# Patient Record
Sex: FEMALE | Race: WHITE | NOT HISPANIC OR LATINO | Employment: OTHER | URBAN - METROPOLITAN AREA
[De-identification: names, ages, dates, MRNs, and addresses within clinical notes are randomized per-mention and may not be internally consistent; named-entity substitution may affect disease eponyms.]

---

## 2017-03-27 ENCOUNTER — HISTORICAL (OUTPATIENT)
Dept: ADMINISTRATIVE | Facility: HOSPITAL | Age: 77
End: 2017-03-27

## 2017-04-10 ENCOUNTER — HISTORICAL (OUTPATIENT)
Dept: ADMINISTRATIVE | Facility: HOSPITAL | Age: 77
End: 2017-04-10

## 2017-04-25 ENCOUNTER — HISTORICAL (OUTPATIENT)
Dept: ADMINISTRATIVE | Facility: HOSPITAL | Age: 77
End: 2017-04-25

## 2017-11-03 ENCOUNTER — HISTORICAL (OUTPATIENT)
Dept: ADMINISTRATIVE | Facility: HOSPITAL | Age: 77
End: 2017-11-03

## 2017-11-03 LAB
BUN SERPL-MCNC: 17 MG/DL (ref 7–18)
CALCIUM SERPL-MCNC: 9.7 MG/DL (ref 8.5–10.1)
CHLORIDE SERPL-SCNC: 106 MMOL/L (ref 98–107)
CHOLEST SERPL-MCNC: 185 MG/DL (ref 0–200)
CHOLEST/HDLC SERPL: 2.7 {RATIO} (ref 0–4)
CO2 SERPL-SCNC: 32 MMOL/L (ref 21–32)
CREAT SERPL-MCNC: 0.64 MG/DL (ref 0.55–1.02)
DEPRECATED CALCIDIOL+CALCIFEROL SERPL-MC: 49.1 NG/ML (ref 30–80)
GLUCOSE SERPL-MCNC: 88 MG/DL (ref 74–106)
HDLC SERPL-MCNC: 68 MG/DL (ref 35–60)
LDLC SERPL CALC-MCNC: 84 MG/DL (ref 0–129)
POTASSIUM SERPL-SCNC: 4.5 MMOL/L (ref 3.5–5.1)
SODIUM SERPL-SCNC: 145 MMOL/L (ref 136–145)
TRIGL SERPL-MCNC: 166 MG/DL (ref 30–150)
VLDLC SERPL CALC-MCNC: 33 MG/DL

## 2018-05-08 ENCOUNTER — HISTORICAL (OUTPATIENT)
Dept: ADMINISTRATIVE | Facility: HOSPITAL | Age: 78
End: 2018-05-08

## 2018-05-08 LAB
ABS NEUT (OLG): 2.9 X10(3)/MCL (ref 2.1–9.2)
ALBUMIN SERPL-MCNC: 4 GM/DL (ref 3.4–5)
ALBUMIN/GLOB SERPL: 1.4 {RATIO}
ALP SERPL-CCNC: 85 UNIT/L (ref 38–126)
ALT SERPL-CCNC: 28 UNIT/L (ref 12–78)
APPEARANCE, UA: CLEAR
AST SERPL-CCNC: 19 UNIT/L (ref 15–37)
BACTERIA SPEC CULT: ABNORMAL /HPF
BASOPHILS # BLD AUTO: 0 X10(3)/MCL (ref 0–0.2)
BASOPHILS NFR BLD AUTO: 0 %
BILIRUB SERPL-MCNC: 1.3 MG/DL (ref 0.2–1)
BILIRUB UR QL STRIP: NEGATIVE
BILIRUBIN DIRECT+TOT PNL SERPL-MCNC: 0.2 MG/DL (ref 0–0.2)
BILIRUBIN DIRECT+TOT PNL SERPL-MCNC: 1.1 MG/DL (ref 0–0.8)
BUN SERPL-MCNC: 13 MG/DL (ref 7–18)
CALCIUM SERPL-MCNC: 9.7 MG/DL (ref 8.5–10.1)
CHLORIDE SERPL-SCNC: 106 MMOL/L (ref 98–107)
CHOLEST SERPL-MCNC: 192 MG/DL (ref 0–200)
CHOLEST/HDLC SERPL: 3.3 {RATIO} (ref 0–4)
CO2 SERPL-SCNC: 29 MMOL/L (ref 21–32)
COLOR UR: YELLOW
CREAT SERPL-MCNC: 0.73 MG/DL (ref 0.55–1.02)
DEPRECATED CALCIDIOL+CALCIFEROL SERPL-MC: 73 NG/ML (ref 30–80)
EOSINOPHIL # BLD AUTO: 0.2 X10(3)/MCL (ref 0–0.9)
EOSINOPHIL NFR BLD AUTO: 4 %
ERYTHROCYTE [DISTWIDTH] IN BLOOD BY AUTOMATED COUNT: 12.7 % (ref 11.5–17)
ERYTHROCYTE [SEDIMENTATION RATE] IN BLOOD: 8 MM/HR (ref 0–20)
GLOBULIN SER-MCNC: 2.9 GM/DL (ref 2.4–3.5)
GLUCOSE (UA): NEGATIVE
GLUCOSE SERPL-MCNC: 88 MG/DL (ref 74–106)
HCT VFR BLD AUTO: 45.9 % (ref 37–47)
HDLC SERPL-MCNC: 59 MG/DL (ref 35–60)
HGB BLD-MCNC: 15 GM/DL (ref 12–16)
HGB UR QL STRIP: NEGATIVE
KETONES UR QL STRIP: NEGATIVE
LDLC SERPL CALC-MCNC: 101 MG/DL (ref 0–129)
LEUKOCYTE ESTERASE UR QL STRIP: ABNORMAL
LYMPHOCYTES # BLD AUTO: 2.3 X10(3)/MCL (ref 0.6–4.6)
LYMPHOCYTES NFR BLD AUTO: 39 %
MCH RBC QN AUTO: 30.9 PG (ref 27–31)
MCHC RBC AUTO-ENTMCNC: 32.7 GM/DL (ref 33–36)
MCV RBC AUTO: 94.6 FL (ref 80–94)
MONOCYTES # BLD AUTO: 0.4 X10(3)/MCL (ref 0.1–1.3)
MONOCYTES NFR BLD AUTO: 7 %
NEUTROPHILS # BLD AUTO: 2.9 X10(3)/MCL (ref 1.4–7.9)
NEUTROPHILS NFR BLD AUTO: 49 %
NITRITE UR QL STRIP: NEGATIVE
PH UR STRIP: 5.5 [PH] (ref 5–9)
PLATELET # BLD AUTO: 230 X10(3)/MCL (ref 130–400)
PMV BLD AUTO: 10 FL (ref 9.4–12.4)
POTASSIUM SERPL-SCNC: 4.1 MMOL/L (ref 3.5–5.1)
PROT SERPL-MCNC: 6.9 GM/DL (ref 6.4–8.2)
PROT UR QL STRIP: NEGATIVE
RBC # BLD AUTO: 4.85 X10(6)/MCL (ref 4.2–5.4)
RBC #/AREA URNS HPF: ABNORMAL /[HPF]
SODIUM SERPL-SCNC: 143 MMOL/L (ref 136–145)
SP GR UR STRIP: 1.01 (ref 1–1.03)
SQUAMOUS EPITHELIAL, UA: ABNORMAL
TRIGL SERPL-MCNC: 158 MG/DL (ref 30–150)
UA WBC MAN: ABNORMAL /HPF
UROBILINOGEN UR STRIP-ACNC: 0.2
VLDLC SERPL CALC-MCNC: 32 MG/DL
WBC # SPEC AUTO: 5.9 X10(3)/MCL (ref 4.5–11.5)

## 2018-11-19 ENCOUNTER — HISTORICAL (OUTPATIENT)
Dept: ADMINISTRATIVE | Facility: HOSPITAL | Age: 78
End: 2018-11-19

## 2018-11-19 LAB
CHOLEST SERPL-MCNC: 196 MG/DL (ref 0–200)
CHOLEST/HDLC SERPL: 3.3 {RATIO} (ref 0–4)
DEPRECATED CALCIDIOL+CALCIFEROL SERPL-MC: 64.08 NG/ML (ref 30–80)
HDLC SERPL-MCNC: 59 MG/DL (ref 35–60)
LDLC SERPL CALC-MCNC: 94 MG/DL (ref 0–129)
TRIGL SERPL-MCNC: 216 MG/DL (ref 30–150)
VLDLC SERPL CALC-MCNC: 43 MG/DL

## 2019-05-23 ENCOUNTER — HISTORICAL (OUTPATIENT)
Dept: LAB | Facility: HOSPITAL | Age: 79
End: 2019-05-23

## 2019-05-23 LAB
ABS NEUT (OLG): 2.34 X10(3)/MCL (ref 2.1–9.2)
ALBUMIN SERPL-MCNC: 3.9 GM/DL (ref 3.4–5)
ALBUMIN/GLOB SERPL: 1.4 {RATIO}
ALP SERPL-CCNC: 90 UNIT/L (ref 38–126)
ALT SERPL-CCNC: 29 UNIT/L (ref 12–78)
APPEARANCE, UA: CLEAR
AST SERPL-CCNC: 17 UNIT/L (ref 15–37)
BACTERIA SPEC CULT: ABNORMAL /HPF
BASOPHILS # BLD AUTO: 0 X10(3)/MCL (ref 0–0.2)
BASOPHILS NFR BLD AUTO: 1 %
BILIRUB SERPL-MCNC: 1.2 MG/DL (ref 0.2–1)
BILIRUB UR QL STRIP: NEGATIVE
BILIRUBIN DIRECT+TOT PNL SERPL-MCNC: 0.3 MG/DL (ref 0–0.2)
BILIRUBIN DIRECT+TOT PNL SERPL-MCNC: 0.9 MG/DL (ref 0–0.8)
BUN SERPL-MCNC: 14 MG/DL (ref 7–18)
CALCIUM SERPL-MCNC: 9.5 MG/DL (ref 8.5–10.1)
CHLORIDE SERPL-SCNC: 107 MMOL/L (ref 98–107)
CHOLEST SERPL-MCNC: 180 MG/DL (ref 0–200)
CHOLEST/HDLC SERPL: 2.8 {RATIO} (ref 0–4)
CO2 SERPL-SCNC: 30 MMOL/L (ref 21–32)
COLOR UR: YELLOW
CREAT SERPL-MCNC: 0.66 MG/DL (ref 0.55–1.02)
DEPRECATED CALCIDIOL+CALCIFEROL SERPL-MC: 67.04 NG/ML (ref 30–80)
EOSINOPHIL # BLD AUTO: 0.2 X10(3)/MCL (ref 0–0.9)
EOSINOPHIL NFR BLD AUTO: 4 %
ERYTHROCYTE [DISTWIDTH] IN BLOOD BY AUTOMATED COUNT: 12.4 % (ref 11.5–17)
ERYTHROCYTE [SEDIMENTATION RATE] IN BLOOD: 9 MM/HR (ref 0–20)
GLOBULIN SER-MCNC: 2.8 GM/DL (ref 2.4–3.5)
GLUCOSE (UA): NEGATIVE
GLUCOSE SERPL-MCNC: 85 MG/DL (ref 74–106)
HCT VFR BLD AUTO: 48.3 % (ref 37–47)
HDLC SERPL-MCNC: 64 MG/DL (ref 35–60)
HGB BLD-MCNC: 14.9 GM/DL (ref 12–16)
HGB UR QL STRIP: NEGATIVE
KETONES UR QL STRIP: NEGATIVE
LDLC SERPL CALC-MCNC: 85 MG/DL (ref 0–129)
LEUKOCYTE ESTERASE UR QL STRIP: ABNORMAL
LYMPHOCYTES # BLD AUTO: 2 X10(3)/MCL (ref 0.6–4.6)
LYMPHOCYTES NFR BLD AUTO: 41 %
MCH RBC QN AUTO: 29.9 PG (ref 27–31)
MCHC RBC AUTO-ENTMCNC: 30.8 GM/DL (ref 33–36)
MCV RBC AUTO: 97 FL (ref 80–94)
MONOCYTES # BLD AUTO: 0.4 X10(3)/MCL (ref 0.1–1.3)
MONOCYTES NFR BLD AUTO: 7 %
NEUTROPHILS # BLD AUTO: 2.34 X10(3)/MCL (ref 2.1–9.2)
NEUTROPHILS NFR BLD AUTO: 47 %
NITRITE UR QL STRIP: NEGATIVE
PH UR STRIP: 5 [PH] (ref 5–9)
PLATELET # BLD AUTO: 195 X10(3)/MCL (ref 130–400)
PMV BLD AUTO: 10.4 FL (ref 9.4–12.4)
POTASSIUM SERPL-SCNC: 3.9 MMOL/L (ref 3.5–5.1)
PROT SERPL-MCNC: 6.7 GM/DL (ref 6.4–8.2)
PROT UR QL STRIP: NEGATIVE
RBC # BLD AUTO: 4.98 X10(6)/MCL (ref 4.2–5.4)
RBC #/AREA URNS HPF: ABNORMAL /[HPF]
SODIUM SERPL-SCNC: 141 MMOL/L (ref 136–145)
SP GR UR STRIP: 1.01 (ref 1–1.03)
SQUAMOUS EPITHELIAL, UA: ABNORMAL
TRIGL SERPL-MCNC: 156 MG/DL (ref 30–150)
TSH SERPL-ACNC: 0.85 MIU/L (ref 0.36–3.74)
UROBILINOGEN UR STRIP-ACNC: 0.2
VLDLC SERPL CALC-MCNC: 31 MG/DL
WBC # SPEC AUTO: 4.9 X10(3)/MCL (ref 4.5–11.5)
WBC #/AREA URNS HPF: ABNORMAL /[HPF]

## 2019-09-05 ENCOUNTER — HISTORICAL (OUTPATIENT)
Dept: ADMINISTRATIVE | Facility: HOSPITAL | Age: 79
End: 2019-09-05

## 2019-09-05 LAB — HCV AB SERPL QL IA: NEGATIVE

## 2019-11-25 ENCOUNTER — HISTORICAL (OUTPATIENT)
Dept: RADIOLOGY | Facility: HOSPITAL | Age: 79
End: 2019-11-25

## 2019-11-27 ENCOUNTER — HISTORICAL (OUTPATIENT)
Dept: ADMINISTRATIVE | Facility: HOSPITAL | Age: 79
End: 2019-11-27

## 2019-11-27 LAB
BUN SERPL-MCNC: 14 MG/DL (ref 7–18)
CALCIUM SERPL-MCNC: 10.3 MG/DL (ref 8.5–10.1)
CHLORIDE SERPL-SCNC: 106 MMOL/L (ref 98–107)
CHOLEST SERPL-MCNC: 224 MG/DL (ref 0–200)
CHOLEST/HDLC SERPL: 3.7 {RATIO} (ref 0–4)
CO2 SERPL-SCNC: 29 MMOL/L (ref 21–32)
CREAT SERPL-MCNC: 0.65 MG/DL (ref 0.55–1.02)
CREAT/UREA NIT SERPL: 21.5
CRP SERPL HS-MCNC: 1.44 MG/L (ref 0–3)
GLUCOSE SERPL-MCNC: 89 MG/DL (ref 74–106)
HDLC SERPL-MCNC: 60 MG/DL (ref 35–60)
LDLC SERPL CALC-MCNC: 109 MG/DL (ref 0–129)
POTASSIUM SERPL-SCNC: 3.8 MMOL/L (ref 3.5–5.1)
SODIUM SERPL-SCNC: 142 MMOL/L (ref 136–145)
TRIGL SERPL-MCNC: 275 MG/DL (ref 30–150)
VLDLC SERPL CALC-MCNC: 55 MG/DL

## 2020-01-02 ENCOUNTER — HISTORICAL (OUTPATIENT)
Dept: ADMINISTRATIVE | Facility: HOSPITAL | Age: 80
End: 2020-01-02

## 2020-01-02 LAB
FLUAV AG UPPER RESP QL IA.RAPID: NEGATIVE
FLUBV AG UPPER RESP QL IA.RAPID: NEGATIVE

## 2020-05-21 ENCOUNTER — HISTORICAL (OUTPATIENT)
Dept: ADMINISTRATIVE | Facility: HOSPITAL | Age: 80
End: 2020-05-21

## 2020-05-21 LAB
ABS NEUT (OLG): 5.4 X10(3)/MCL (ref 2.1–9.2)
ALBUMIN SERPL-MCNC: 4.1 GM/DL (ref 3.4–4.8)
ALBUMIN/GLOB SERPL: 1.8 RATIO (ref 1.1–2)
ALP SERPL-CCNC: 88 UNIT/L (ref 40–150)
ALT SERPL-CCNC: 19 UNIT/L (ref 0–55)
APPEARANCE, UA: ABNORMAL
AST SERPL-CCNC: 20 UNIT/L (ref 5–34)
BACTERIA SPEC CULT: ABNORMAL /HPF
BASOPHILS # BLD AUTO: 0 X10(3)/MCL (ref 0–0.2)
BASOPHILS NFR BLD AUTO: 0 %
BILIRUB SERPL-MCNC: 1.4 MG/DL
BILIRUB UR QL STRIP: NEGATIVE
BILIRUBIN DIRECT+TOT PNL SERPL-MCNC: 0.5 MG/DL (ref 0–0.5)
BILIRUBIN DIRECT+TOT PNL SERPL-MCNC: 0.9 MG/DL (ref 0–0.8)
BUN SERPL-MCNC: 12.1 MG/DL (ref 9.8–20.1)
CALCIUM SERPL-MCNC: 10 MG/DL (ref 8.4–10.2)
CHLORIDE SERPL-SCNC: 107 MMOL/L (ref 98–107)
CHOLEST SERPL-MCNC: 181 MG/DL
CHOLEST/HDLC SERPL: 3 {RATIO} (ref 0–5)
CO2 SERPL-SCNC: 27 MMOL/L (ref 23–31)
COLOR UR: YELLOW
CREAT SERPL-MCNC: 0.65 MG/DL (ref 0.55–1.02)
DEPRECATED CALCIDIOL+CALCIFEROL SERPL-MC: 76.8 NG/ML (ref 6.6–49.9)
EOSINOPHIL # BLD AUTO: 0.3 X10(3)/MCL (ref 0–0.9)
EOSINOPHIL NFR BLD AUTO: 3 %
ERYTHROCYTE [DISTWIDTH] IN BLOOD BY AUTOMATED COUNT: 12.5 % (ref 11.5–17)
ERYTHROCYTE [SEDIMENTATION RATE] IN BLOOD: 10 MM/HR (ref 0–20)
GGT SERPL-CCNC: 35 U/L (ref 9–36)
GLOBULIN SER-MCNC: 2.3 GM/DL (ref 2.4–3.5)
GLUCOSE (UA): NEGATIVE
GLUCOSE SERPL-MCNC: 89 MG/DL (ref 82–115)
HCT VFR BLD AUTO: 46.1 % (ref 37–47)
HDLC SERPL-MCNC: 55 MG/DL (ref 35–60)
HGB BLD-MCNC: 14.7 GM/DL (ref 12–16)
HGB UR QL STRIP: ABNORMAL
KETONES UR QL STRIP: NEGATIVE
LDLC SERPL CALC-MCNC: 101 MG/DL (ref 50–140)
LEUKOCYTE ESTERASE UR QL STRIP: ABNORMAL
LYMPHOCYTES # BLD AUTO: 2.5 X10(3)/MCL (ref 0.6–4.6)
LYMPHOCYTES NFR BLD AUTO: 29 %
MCH RBC QN AUTO: 29.9 PG (ref 27–31)
MCHC RBC AUTO-ENTMCNC: 31.9 GM/DL (ref 33–36)
MCV RBC AUTO: 93.9 FL (ref 80–94)
MONOCYTES # BLD AUTO: 0.5 X10(3)/MCL (ref 0.1–1.3)
MONOCYTES NFR BLD AUTO: 6 %
NEUTROPHILS # BLD AUTO: 5.4 X10(3)/MCL (ref 2.1–9.2)
NEUTROPHILS NFR BLD AUTO: 62 %
NITRITE UR QL STRIP: NEGATIVE
PH UR STRIP: 6 [PH] (ref 5–9)
PLATELET # BLD AUTO: 189 X10(3)/MCL (ref 130–400)
PMV BLD AUTO: 10.2 FL (ref 9.4–12.4)
POTASSIUM SERPL-SCNC: 3.9 MMOL/L (ref 3.5–5.1)
PROT SERPL-MCNC: 6.4 GM/DL (ref 5.8–7.6)
PROT UR QL STRIP: NEGATIVE
RBC # BLD AUTO: 4.91 X10(6)/MCL (ref 4.2–5.4)
RBC #/AREA URNS HPF: ABNORMAL /[HPF]
SODIUM SERPL-SCNC: 142 MMOL/L (ref 136–145)
SP GR UR STRIP: 1.01 (ref 1–1.03)
SQUAMOUS EPITHELIAL, UA: ABNORMAL
TRIGL SERPL-MCNC: 123 MG/DL (ref 37–140)
UROBILINOGEN UR STRIP-ACNC: 0.2
VLDLC SERPL CALC-MCNC: 25 MG/DL
WBC # SPEC AUTO: 8.7 X10(3)/MCL (ref 4.5–11.5)
WBC #/AREA URNS HPF: 242 /HPF (ref 0–3)

## 2020-11-12 ENCOUNTER — HISTORICAL (OUTPATIENT)
Dept: ADMINISTRATIVE | Facility: HOSPITAL | Age: 80
End: 2020-11-12

## 2020-11-12 LAB
ABS NEUT (OLG): 2.89 X10(3)/MCL (ref 2.1–9.2)
APPEARANCE, UA: CLEAR
BACTERIA SPEC CULT: ABNORMAL /HPF
BASOPHILS # BLD AUTO: 0 X10(3)/MCL (ref 0–0.2)
BASOPHILS NFR BLD AUTO: 0 %
BILIRUB UR QL STRIP: NEGATIVE
BMD RECOMMENDATION EXT: NORMAL
BUN SERPL-MCNC: 13.3 MG/DL (ref 9.8–20.1)
CALCIUM SERPL-MCNC: 10.1 MG/DL (ref 8.4–10.2)
CHLORIDE SERPL-SCNC: 105 MMOL/L (ref 98–107)
CHOLEST SERPL-MCNC: 190 MG/DL
CHOLEST/HDLC SERPL: 3 {RATIO} (ref 0–5)
CO2 SERPL-SCNC: 30 MMOL/L (ref 23–31)
COLOR UR: YELLOW
CREAT SERPL-MCNC: 0.71 MG/DL (ref 0.55–1.02)
CREAT/UREA NIT SERPL: 19
EOSINOPHIL # BLD AUTO: 0.2 X10(3)/MCL (ref 0–0.9)
EOSINOPHIL NFR BLD AUTO: 4 %
ERYTHROCYTE [DISTWIDTH] IN BLOOD BY AUTOMATED COUNT: 12.7 % (ref 11.5–17)
GLUCOSE (UA): NEGATIVE
GLUCOSE SERPL-MCNC: 86 MG/DL (ref 82–115)
HCT VFR BLD AUTO: 48.2 % (ref 37–47)
HDLC SERPL-MCNC: 59 MG/DL (ref 35–60)
HGB BLD-MCNC: 15.1 GM/DL (ref 12–16)
HGB UR QL STRIP: NEGATIVE
KETONES UR QL STRIP: NEGATIVE
LDLC SERPL CALC-MCNC: 109 MG/DL (ref 50–140)
LEUKOCYTE ESTERASE UR QL STRIP: ABNORMAL
LYMPHOCYTES # BLD AUTO: 2.1 X10(3)/MCL (ref 0.6–4.6)
LYMPHOCYTES NFR BLD AUTO: 38 %
MCH RBC QN AUTO: 29.8 PG (ref 27–31)
MCHC RBC AUTO-ENTMCNC: 31.3 GM/DL (ref 33–36)
MCV RBC AUTO: 95.3 FL (ref 80–94)
MONOCYTES # BLD AUTO: 0.4 X10(3)/MCL (ref 0.1–1.3)
MONOCYTES NFR BLD AUTO: 7 %
NEUTROPHILS # BLD AUTO: 2.89 X10(3)/MCL (ref 2.1–9.2)
NEUTROPHILS NFR BLD AUTO: 51 %
NITRITE UR QL STRIP: NEGATIVE
PH UR STRIP: 8 [PH] (ref 5–9)
PLATELET # BLD AUTO: 213 X10(3)/MCL (ref 130–400)
PMV BLD AUTO: 10 FL (ref 9.4–12.4)
POTASSIUM SERPL-SCNC: 4.4 MMOL/L (ref 3.5–5.1)
PROT UR QL STRIP: NEGATIVE
RBC # BLD AUTO: 5.06 X10(6)/MCL (ref 4.2–5.4)
RBC #/AREA URNS HPF: ABNORMAL /[HPF]
SODIUM SERPL-SCNC: 145 MMOL/L (ref 136–145)
SP GR UR STRIP: 1.01 (ref 1–1.03)
SQUAMOUS EPITHELIAL, UA: ABNORMAL
TRIGL SERPL-MCNC: 112 MG/DL (ref 37–140)
UROBILINOGEN UR STRIP-ACNC: 0.2
VLDLC SERPL CALC-MCNC: 22 MG/DL
WBC # SPEC AUTO: 5.6 X10(3)/MCL (ref 4.5–11.5)
WBC #/AREA URNS HPF: ABNORMAL /HPF

## 2021-01-14 ENCOUNTER — HISTORICAL (OUTPATIENT)
Dept: RADIOLOGY | Facility: HOSPITAL | Age: 81
End: 2021-01-14

## 2021-05-28 ENCOUNTER — HISTORICAL (OUTPATIENT)
Dept: ADMINISTRATIVE | Facility: HOSPITAL | Age: 81
End: 2021-05-28

## 2021-05-28 LAB
ABS NEUT (OLG): 4.28 X10(3)/MCL (ref 2.1–9.2)
ALBUMIN SERPL-MCNC: 4 GM/DL (ref 3.4–4.8)
ALBUMIN/GLOB SERPL: 1.6 RATIO (ref 1.1–2)
ALP SERPL-CCNC: 81 UNIT/L (ref 40–150)
ALT SERPL-CCNC: 21 UNIT/L (ref 0–55)
APPEARANCE, UA: CLEAR
AST SERPL-CCNC: 19 UNIT/L (ref 5–34)
BACTERIA SPEC CULT: ABNORMAL /HPF
BASOPHILS # BLD AUTO: 0 X10(3)/MCL (ref 0–0.2)
BASOPHILS NFR BLD AUTO: 0 %
BILIRUB SERPL-MCNC: 1 MG/DL
BILIRUB UR QL STRIP: NEGATIVE
BILIRUBIN DIRECT+TOT PNL SERPL-MCNC: 0.3 MG/DL (ref 0–0.5)
BILIRUBIN DIRECT+TOT PNL SERPL-MCNC: 0.7 MG/DL (ref 0–0.8)
BUN SERPL-MCNC: 13.1 MG/DL (ref 9.8–20.1)
CALCIUM SERPL-MCNC: 10.1 MG/DL (ref 8.4–10.2)
CHLORIDE SERPL-SCNC: 105 MMOL/L (ref 98–107)
CHOLEST SERPL-MCNC: 178 MG/DL
CHOLEST/HDLC SERPL: 4 {RATIO} (ref 0–5)
CO2 SERPL-SCNC: 26 MMOL/L (ref 23–31)
COLOR UR: YELLOW
CREAT SERPL-MCNC: 0.68 MG/DL (ref 0.55–1.02)
DEPRECATED CALCIDIOL+CALCIFEROL SERPL-MC: 80.7 NG/ML (ref 30–80)
EOSINOPHIL # BLD AUTO: 0.5 X10(3)/MCL (ref 0–0.9)
EOSINOPHIL NFR BLD AUTO: 7 %
ERYTHROCYTE [DISTWIDTH] IN BLOOD BY AUTOMATED COUNT: 12.8 % (ref 11.5–17)
ERYTHROCYTE [SEDIMENTATION RATE] IN BLOOD: 5 MM/HR (ref 0–20)
GGT SERPL-CCNC: 40 U/L (ref 9–36)
GLOBULIN SER-MCNC: 2.5 GM/DL (ref 2.4–3.5)
GLUCOSE (UA): NEGATIVE
GLUCOSE SERPL-MCNC: 84 MG/DL (ref 82–115)
HCT VFR BLD AUTO: 49.3 % (ref 37–47)
HDLC SERPL-MCNC: 48 MG/DL (ref 35–60)
HGB BLD-MCNC: 15.4 GM/DL (ref 12–16)
HGB UR QL STRIP: NEGATIVE
KETONES UR QL STRIP: NEGATIVE
LDLC SERPL CALC-MCNC: 93 MG/DL (ref 50–140)
LEUKOCYTE ESTERASE UR QL STRIP: ABNORMAL
LYMPHOCYTES # BLD AUTO: 2.4 X10(3)/MCL (ref 0.6–4.6)
LYMPHOCYTES NFR BLD AUTO: 31 %
MCH RBC QN AUTO: 30.2 PG (ref 27–31)
MCHC RBC AUTO-ENTMCNC: 31.2 GM/DL (ref 33–36)
MCV RBC AUTO: 96.7 FL (ref 80–94)
MONOCYTES # BLD AUTO: 0.4 X10(3)/MCL (ref 0.1–1.3)
MONOCYTES NFR BLD AUTO: 6 %
NEUTROPHILS # BLD AUTO: 4.28 X10(3)/MCL (ref 2.1–9.2)
NEUTROPHILS NFR BLD AUTO: 56 %
NITRITE UR QL STRIP: NEGATIVE
PH UR STRIP: 7.5 [PH] (ref 5–9)
PLATELET # BLD AUTO: 201 X10(3)/MCL (ref 130–400)
PMV BLD AUTO: 10.2 FL (ref 9.4–12.4)
POTASSIUM SERPL-SCNC: 4.5 MMOL/L (ref 3.5–5.1)
PROT SERPL-MCNC: 6.5 GM/DL (ref 5.8–7.6)
PROT UR QL STRIP: NEGATIVE
RBC # BLD AUTO: 5.1 X10(6)/MCL (ref 4.2–5.4)
RBC #/AREA URNS HPF: ABNORMAL /[HPF]
SODIUM SERPL-SCNC: 145 MMOL/L (ref 136–145)
SP GR UR STRIP: 1 (ref 1–1.03)
SQUAMOUS EPITHELIAL, UA: ABNORMAL /HPF
TRIGL SERPL-MCNC: 183 MG/DL (ref 37–140)
UROBILINOGEN UR STRIP-ACNC: 0.2
VLDLC SERPL CALC-MCNC: 37 MG/DL
WBC # SPEC AUTO: 7.7 X10(3)/MCL (ref 4.5–11.5)
WBC #/AREA URNS HPF: ABNORMAL /[HPF]

## 2021-06-10 ENCOUNTER — HISTORICAL (OUTPATIENT)
Dept: RADIOLOGY | Facility: HOSPITAL | Age: 81
End: 2021-06-10

## 2021-12-16 ENCOUNTER — HISTORICAL (OUTPATIENT)
Dept: ADMINISTRATIVE | Facility: HOSPITAL | Age: 81
End: 2021-12-16

## 2021-12-16 LAB
ABS NEUT (OLG): 2.46 X10(3)/MCL (ref 2.1–9.2)
ALBUMIN SERPL-MCNC: 4 GM/DL (ref 3.4–4.8)
ALBUMIN/GLOB SERPL: 1.5 RATIO (ref 1.1–2)
ALP SERPL-CCNC: 72 UNIT/L (ref 40–150)
ALT SERPL-CCNC: 27 UNIT/L (ref 0–55)
APPEARANCE, UA: CLEAR
AST SERPL-CCNC: 23 UNIT/L (ref 5–34)
BACTERIA SPEC CULT: ABNORMAL /HPF
BASOPHILS # BLD AUTO: 0 X10(3)/MCL (ref 0–0.2)
BASOPHILS NFR BLD AUTO: 1 %
BILIRUB SERPL-MCNC: 1.2 MG/DL
BILIRUB UR QL STRIP: NEGATIVE
BILIRUBIN DIRECT+TOT PNL SERPL-MCNC: 0.4 MG/DL (ref 0–0.5)
BILIRUBIN DIRECT+TOT PNL SERPL-MCNC: 0.8 MG/DL (ref 0–0.8)
BUN SERPL-MCNC: 13.2 MG/DL (ref 9.8–20.1)
CALCIUM SERPL-MCNC: 9.8 MG/DL (ref 8.7–10.5)
CHLORIDE SERPL-SCNC: 108 MMOL/L (ref 98–107)
CHOLEST SERPL-MCNC: 199 MG/DL
CHOLEST/HDLC SERPL: 3 {RATIO} (ref 0–5)
CO2 SERPL-SCNC: 30 MMOL/L (ref 23–31)
COLOR UR: YELLOW
CREAT SERPL-MCNC: 0.67 MG/DL (ref 0.55–1.02)
DEPRECATED CALCIDIOL+CALCIFEROL SERPL-MC: 92 NG/ML (ref 30–80)
EOSINOPHIL # BLD AUTO: 0.2 X10(3)/MCL (ref 0–0.9)
EOSINOPHIL NFR BLD AUTO: 4 %
ERYTHROCYTE [DISTWIDTH] IN BLOOD BY AUTOMATED COUNT: 13.1 % (ref 11.5–17)
GLOBULIN SER-MCNC: 2.6 GM/DL (ref 2.4–3.5)
GLUCOSE (UA): NEGATIVE
GLUCOSE SERPL-MCNC: 97 MG/DL (ref 82–115)
HCT VFR BLD AUTO: 46.3 % (ref 37–47)
HDLC SERPL-MCNC: 61 MG/DL (ref 35–60)
HGB BLD-MCNC: 14.9 GM/DL (ref 12–16)
HGB UR QL STRIP: NEGATIVE
KETONES UR QL STRIP: NEGATIVE
LDLC SERPL CALC-MCNC: 112 MG/DL (ref 50–140)
LEUKOCYTE ESTERASE UR QL STRIP: ABNORMAL
LYMPHOCYTES # BLD AUTO: 2.2 X10(3)/MCL (ref 0.6–4.6)
LYMPHOCYTES NFR BLD AUTO: 42 %
MCH RBC QN AUTO: 30.7 PG (ref 27–31)
MCHC RBC AUTO-ENTMCNC: 32.2 GM/DL (ref 33–36)
MCV RBC AUTO: 95.3 FL (ref 80–94)
MONOCYTES # BLD AUTO: 0.4 X10(3)/MCL (ref 0.1–1.3)
MONOCYTES NFR BLD AUTO: 7 %
NEUTROPHILS # BLD AUTO: 2.46 X10(3)/MCL (ref 2.1–9.2)
NEUTROPHILS NFR BLD AUTO: 46 %
NITRITE UR QL STRIP: NEGATIVE
PH UR STRIP: 7.5 [PH] (ref 5–9)
PLATELET # BLD AUTO: 221 X10(3)/MCL (ref 130–400)
PMV BLD AUTO: 9.8 FL (ref 9.4–12.4)
POTASSIUM SERPL-SCNC: 4 MMOL/L (ref 3.5–5.1)
PROT SERPL-MCNC: 6.6 GM/DL (ref 5.8–7.6)
PROT UR QL STRIP: NEGATIVE
RBC # BLD AUTO: 4.86 X10(6)/MCL (ref 4.2–5.4)
RBC #/AREA URNS HPF: ABNORMAL /[HPF]
SODIUM SERPL-SCNC: 144 MMOL/L (ref 136–145)
SP GR UR STRIP: 1 (ref 1–1.03)
SQUAMOUS EPITHELIAL, UA: ABNORMAL /HPF (ref 0–4)
TRIGL SERPL-MCNC: 132 MG/DL (ref 37–140)
UROBILINOGEN UR STRIP-ACNC: 0.2
VLDLC SERPL CALC-MCNC: 26 MG/DL
WBC # SPEC AUTO: 5.3 X10(3)/MCL (ref 4.5–11.5)
WBC #/AREA URNS AUTO: ABNORMAL /HPF (ref 0–3)
WBC #/AREA URNS HPF: ABNORMAL /[HPF]

## 2022-02-17 ENCOUNTER — HISTORICAL (OUTPATIENT)
Dept: RADIOLOGY | Facility: HOSPITAL | Age: 82
End: 2022-02-17

## 2022-02-17 ENCOUNTER — HISTORICAL (OUTPATIENT)
Dept: ADMINISTRATIVE | Facility: HOSPITAL | Age: 82
End: 2022-02-17

## 2022-04-11 ENCOUNTER — HISTORICAL (OUTPATIENT)
Dept: ADMINISTRATIVE | Facility: HOSPITAL | Age: 82
End: 2022-04-11
Payer: MEDICARE

## 2022-04-27 VITALS
WEIGHT: 137.38 LBS | DIASTOLIC BLOOD PRESSURE: 82 MMHG | SYSTOLIC BLOOD PRESSURE: 129 MMHG | HEIGHT: 63 IN | BODY MASS INDEX: 24.34 KG/M2 | OXYGEN SATURATION: 95 %

## 2022-06-24 ENCOUNTER — APPOINTMENT (OUTPATIENT)
Dept: LAB | Facility: HOSPITAL | Age: 82
End: 2022-06-24
Attending: INTERNAL MEDICINE
Payer: MEDICARE

## 2022-06-24 DIAGNOSIS — Z86.010 PERSONAL HISTORY OF COLONIC POLYPS: ICD-10-CM

## 2022-06-24 DIAGNOSIS — K75.4 CHRONIC AGGRESSIVE HEPATITIS: ICD-10-CM

## 2022-06-24 DIAGNOSIS — Z79.899 ENCOUNTER FOR LONG-TERM (CURRENT) USE OF OTHER MEDICATIONS: ICD-10-CM

## 2022-06-24 DIAGNOSIS — K21.9 GASTROESOPHAGEAL REFLUX DISEASE, UNSPECIFIED WHETHER ESOPHAGITIS PRESENT: Primary | ICD-10-CM

## 2022-06-24 LAB
ALBUMIN SERPL-MCNC: 3.9 GM/DL (ref 3.4–4.8)
ALBUMIN/GLOB SERPL: 1.6 RATIO (ref 1.1–2)
ALP SERPL-CCNC: 79 UNIT/L (ref 40–150)
ALT SERPL-CCNC: 18 UNIT/L (ref 0–55)
AST SERPL-CCNC: 18 UNIT/L (ref 5–34)
BASOPHILS # BLD AUTO: 0.04 X10(3)/MCL (ref 0–0.2)
BASOPHILS NFR BLD AUTO: 0.7 %
BILIRUBIN DIRECT+TOT PNL SERPL-MCNC: 1 MG/DL
BUN SERPL-MCNC: 14.2 MG/DL (ref 9.8–20.1)
CALCIUM SERPL-MCNC: 10.1 MG/DL (ref 8.4–10.2)
CHLORIDE SERPL-SCNC: 107 MMOL/L (ref 98–107)
CHOLEST SERPL-MCNC: 191 MG/DL
CHOLEST/HDLC SERPL: 3 {RATIO} (ref 0–5)
CO2 SERPL-SCNC: 26 MMOL/L (ref 23–31)
CREAT SERPL-MCNC: 0.66 MG/DL (ref 0.55–1.02)
DEPRECATED CALCIDIOL+CALCIFEROL SERPL-MC: 83.8 NG/ML (ref 30–80)
EOSINOPHIL # BLD AUTO: 0.46 X10(3)/MCL (ref 0–0.9)
EOSINOPHIL NFR BLD AUTO: 7.9 %
ERYTHROCYTE [DISTWIDTH] IN BLOOD BY AUTOMATED COUNT: 12.7 % (ref 11.5–17)
ERYTHROCYTE [SEDIMENTATION RATE] IN BLOOD: 7 MM/HR (ref 0–20)
GGT SERPL-CCNC: 31 U/L (ref 9–36)
GLOBULIN SER-MCNC: 2.4 GM/DL (ref 2.4–3.5)
GLUCOSE SERPL-MCNC: 87 MG/DL (ref 82–115)
HCT VFR BLD AUTO: 46.7 % (ref 37–47)
HDLC SERPL-MCNC: 56 MG/DL (ref 35–60)
HGB BLD-MCNC: 15 GM/DL (ref 12–16)
IMM GRANULOCYTES # BLD AUTO: 0.02 X10(3)/MCL (ref 0–0.02)
IMM GRANULOCYTES NFR BLD AUTO: 0.3 % (ref 0–0.43)
LDLC SERPL CALC-MCNC: 107 MG/DL (ref 50–140)
LYMPHOCYTES # BLD AUTO: 2.08 X10(3)/MCL (ref 0.6–4.6)
LYMPHOCYTES NFR BLD AUTO: 35.7 %
MCH RBC QN AUTO: 30.4 PG (ref 27–31)
MCHC RBC AUTO-ENTMCNC: 32.1 MG/DL (ref 33–36)
MCV RBC AUTO: 94.7 FL (ref 80–94)
MONOCYTES # BLD AUTO: 0.39 X10(3)/MCL (ref 0.1–1.3)
MONOCYTES NFR BLD AUTO: 6.7 %
NEUTROPHILS # BLD AUTO: 2.8 X10(3)/MCL (ref 2.1–9.2)
NEUTROPHILS NFR BLD AUTO: 48.7 %
NRBC BLD AUTO-RTO: 0 %
PLATELET # BLD AUTO: 204 X10(3)/MCL (ref 130–400)
PMV BLD AUTO: 10.1 FL (ref 9.4–12.4)
POTASSIUM SERPL-SCNC: 4.5 MMOL/L (ref 3.5–5.1)
PROT SERPL-MCNC: 6.3 GM/DL (ref 5.8–7.6)
RBC # BLD AUTO: 4.93 X10(6)/MCL (ref 4.2–5.4)
SODIUM SERPL-SCNC: 142 MMOL/L (ref 136–145)
TRIGL SERPL-MCNC: 142 MG/DL (ref 37–140)
VLDLC SERPL CALC-MCNC: 28 MG/DL
WBC # SPEC AUTO: 5.8 X10(3)/MCL (ref 4.5–11.5)

## 2022-06-24 PROCEDURE — 82306 VITAMIN D 25 HYDROXY: CPT

## 2022-06-24 PROCEDURE — 36415 COLL VENOUS BLD VENIPUNCTURE: CPT

## 2022-06-24 PROCEDURE — 85651 RBC SED RATE NONAUTOMATED: CPT

## 2022-06-24 PROCEDURE — 82977 ASSAY OF GGT: CPT

## 2022-06-24 PROCEDURE — 80053 COMPREHEN METABOLIC PANEL: CPT

## 2022-06-24 PROCEDURE — 80061 LIPID PANEL: CPT

## 2022-06-24 PROCEDURE — 85025 COMPLETE CBC W/AUTO DIFF WBC: CPT

## 2022-08-31 ENCOUNTER — TELEPHONE (OUTPATIENT)
Dept: INTERNAL MEDICINE | Facility: CLINIC | Age: 82
End: 2022-08-31
Payer: MEDICARE

## 2022-08-31 DIAGNOSIS — R06.02 SOB (SHORTNESS OF BREATH): Primary | ICD-10-CM

## 2022-08-31 RX ORDER — CLOPIDOGREL BISULFATE 75 MG/1
1 TABLET ORAL DAILY
COMMUNITY
Start: 2022-05-20 | End: 2022-11-10

## 2022-08-31 RX ORDER — BUDESONIDE AND FORMOTEROL FUMARATE DIHYDRATE 160; 4.5 UG/1; UG/1
2 AEROSOL RESPIRATORY (INHALATION) 2 TIMES DAILY
COMMUNITY
Start: 2022-06-08 | End: 2022-12-09

## 2022-08-31 RX ORDER — ALBUTEROL SULFATE 90 UG/1
2 AEROSOL, METERED RESPIRATORY (INHALATION) EVERY 6 HOURS PRN
Qty: 18 G | Refills: 12 | Status: SHIPPED | OUTPATIENT
Start: 2022-08-31 | End: 2024-03-13

## 2022-08-31 NOTE — TELEPHONE ENCOUNTER
Reports had COVID in early July only symptom was fatigue.  Over that now  Reports had 1 drop of nipple discharge 1 month ago with no pain the discharge was clear and has not recurred she will monitor the breast for further discharge.  Refill her albuterol inhaler.

## 2022-08-31 NOTE — TELEPHONE ENCOUNTER
----- Message from Mercedes Golden sent at 8/31/2022 11:00 AM CDT -----  Regarding: refill  Needs albuterol rescue inhaler refilled at [facility] in [place]

## 2022-08-31 NOTE — TELEPHONE ENCOUNTER
----- Message from Mercedes Golden sent at 8/31/2022 11:01 AM CDT -----  Regarding: covid history and nipple discharge  Patient wants you to document she had covid on 7/2 and she wants to talk to you about left nipple discharge she had about a month ago. It was clear to light yellow. Her last mammo was in feb. Call her at [phone]

## 2023-01-18 ENCOUNTER — LAB VISIT (OUTPATIENT)
Dept: LAB | Facility: HOSPITAL | Age: 83
End: 2023-01-18
Attending: INTERNAL MEDICINE
Payer: MEDICARE

## 2023-01-18 DIAGNOSIS — Z00.00 ROUTINE GENERAL MEDICAL EXAMINATION AT A HEALTH CARE FACILITY: Primary | ICD-10-CM

## 2023-01-18 DIAGNOSIS — Z86.73 PERSONAL HISTORY OF TRANSIENT CEREBRAL ISCHEMIA: ICD-10-CM

## 2023-01-18 DIAGNOSIS — E78.5 HYPERLIPIDEMIA, UNSPECIFIED HYPERLIPIDEMIA TYPE: ICD-10-CM

## 2023-01-18 DIAGNOSIS — E55.9 AVITAMINOSIS D: ICD-10-CM

## 2023-01-18 LAB
ALBUMIN SERPL-MCNC: 4.1 G/DL (ref 3.4–4.8)
ALBUMIN/GLOB SERPL: 1.6 RATIO (ref 1.1–2)
ALP SERPL-CCNC: 74 UNIT/L (ref 40–150)
ALT SERPL-CCNC: 21 UNIT/L (ref 0–55)
APPEARANCE UR: CLEAR
AST SERPL-CCNC: 24 UNIT/L (ref 5–34)
BACTERIA #/AREA URNS AUTO: NORMAL /HPF
BASOPHILS # BLD AUTO: 0.04 X10(3)/MCL (ref 0–0.2)
BASOPHILS NFR BLD AUTO: 0.7 %
BILIRUB UR QL STRIP.AUTO: NEGATIVE MG/DL
BILIRUBIN DIRECT+TOT PNL SERPL-MCNC: 1.1 MG/DL
BUN SERPL-MCNC: 13.6 MG/DL (ref 9.8–20.1)
CALCIUM SERPL-MCNC: 10.4 MG/DL (ref 8.4–10.2)
CHLORIDE SERPL-SCNC: 104 MMOL/L (ref 98–107)
CHOLEST SERPL-MCNC: 190 MG/DL
CHOLEST/HDLC SERPL: 3 {RATIO} (ref 0–5)
CO2 SERPL-SCNC: 28 MMOL/L (ref 23–31)
COLOR UR AUTO: YELLOW
CREAT SERPL-MCNC: 0.7 MG/DL (ref 0.55–1.02)
DEPRECATED CALCIDIOL+CALCIFEROL SERPL-MC: 79.5 NG/ML (ref 30–80)
EOSINOPHIL # BLD AUTO: 0.26 X10(3)/MCL (ref 0–0.9)
EOSINOPHIL NFR BLD AUTO: 4.8 %
ERYTHROCYTE [DISTWIDTH] IN BLOOD BY AUTOMATED COUNT: 12.6 % (ref 11.5–17)
GFR SERPLBLD CREATININE-BSD FMLA CKD-EPI: >60 MLS/MIN/1.73/M2
GLOBULIN SER-MCNC: 2.5 GM/DL (ref 2.4–3.5)
GLUCOSE SERPL-MCNC: 92 MG/DL (ref 82–115)
GLUCOSE UR QL STRIP.AUTO: NEGATIVE MG/DL
HCT VFR BLD AUTO: 48.7 % (ref 37–47)
HDLC SERPL-MCNC: 56 MG/DL (ref 35–60)
HGB BLD-MCNC: 15.5 GM/DL (ref 12–16)
IMM GRANULOCYTES # BLD AUTO: 0.01 X10(3)/MCL (ref 0–0.04)
IMM GRANULOCYTES NFR BLD AUTO: 0.2 %
KETONES UR QL STRIP.AUTO: NEGATIVE MG/DL
LDLC SERPL CALC-MCNC: 106 MG/DL (ref 50–140)
LEUKOCYTE ESTERASE UR QL STRIP.AUTO: ABNORMAL UNIT/L
LYMPHOCYTES # BLD AUTO: 2.33 X10(3)/MCL (ref 0.6–4.6)
LYMPHOCYTES NFR BLD AUTO: 42.8 %
MCH RBC QN AUTO: 30.6 PG
MCHC RBC AUTO-ENTMCNC: 31.8 MG/DL (ref 33–36)
MCV RBC AUTO: 96.1 FL (ref 80–94)
MONOCYTES # BLD AUTO: 0.32 X10(3)/MCL (ref 0.1–1.3)
MONOCYTES NFR BLD AUTO: 5.9 %
NEUTROPHILS # BLD AUTO: 2.48 X10(3)/MCL (ref 2.1–9.2)
NEUTROPHILS NFR BLD AUTO: 45.6 %
NITRITE UR QL STRIP.AUTO: NEGATIVE
NRBC BLD AUTO-RTO: 0 %
PH UR STRIP.AUTO: 8 [PH]
PLATELET # BLD AUTO: 211 X10(3)/MCL (ref 130–400)
PMV BLD AUTO: 9.6 FL (ref 7.4–10.4)
POTASSIUM SERPL-SCNC: 4.4 MMOL/L (ref 3.5–5.1)
PROT SERPL-MCNC: 6.6 GM/DL (ref 5.8–7.6)
PROT UR QL STRIP.AUTO: NEGATIVE MG/DL
RBC # BLD AUTO: 5.07 X10(6)/MCL (ref 4.2–5.4)
RBC #/AREA URNS AUTO: NORMAL /HPF
RBC UR QL AUTO: NEGATIVE UNIT/L
SODIUM SERPL-SCNC: 140 MMOL/L (ref 136–145)
SP GR UR STRIP.AUTO: 1 (ref 1–1.03)
SQUAMOUS #/AREA URNS AUTO: NORMAL /HPF
TRIGL SERPL-MCNC: 141 MG/DL (ref 37–140)
UROBILINOGEN UR STRIP-ACNC: 0.2 MG/DL
VLDLC SERPL CALC-MCNC: 28 MG/DL
WBC # SPEC AUTO: 5.4 X10(3)/MCL (ref 4.5–11.5)
WBC #/AREA URNS AUTO: NORMAL /HPF

## 2023-01-18 PROCEDURE — 80053 COMPREHEN METABOLIC PANEL: CPT

## 2023-01-18 PROCEDURE — 82306 VITAMIN D 25 HYDROXY: CPT

## 2023-01-18 PROCEDURE — 81001 URINALYSIS AUTO W/SCOPE: CPT

## 2023-01-18 PROCEDURE — 36415 COLL VENOUS BLD VENIPUNCTURE: CPT

## 2023-01-18 PROCEDURE — 85025 COMPLETE CBC W/AUTO DIFF WBC: CPT

## 2023-01-18 PROCEDURE — 80061 LIPID PANEL: CPT

## 2023-01-19 ENCOUNTER — TELEPHONE (OUTPATIENT)
Dept: INTERNAL MEDICINE | Facility: CLINIC | Age: 83
End: 2023-01-19
Payer: MEDICARE

## 2023-01-19 RX ORDER — URSODIOL 300 MG/1
1 CAPSULE ORAL DAILY
COMMUNITY
Start: 2022-12-15

## 2023-01-19 RX ORDER — FAMOTIDINE 40 MG/1
40 TABLET, FILM COATED ORAL DAILY
COMMUNITY
Start: 2022-11-29 | End: 2024-03-13

## 2023-01-25 ENCOUNTER — OFFICE VISIT (OUTPATIENT)
Dept: INTERNAL MEDICINE | Facility: CLINIC | Age: 83
End: 2023-01-25
Payer: MEDICARE

## 2023-01-25 VITALS
HEIGHT: 63 IN | BODY MASS INDEX: 26.87 KG/M2 | DIASTOLIC BLOOD PRESSURE: 76 MMHG | WEIGHT: 151.63 LBS | HEART RATE: 73 BPM | OXYGEN SATURATION: 97 % | SYSTOLIC BLOOD PRESSURE: 130 MMHG

## 2023-01-25 DIAGNOSIS — J45.21 MILD INTERMITTENT ASTHMA WITH ACUTE EXACERBATION: ICD-10-CM

## 2023-01-25 DIAGNOSIS — Z12.31 BREAST CANCER SCREENING BY MAMMOGRAM: ICD-10-CM

## 2023-01-25 DIAGNOSIS — Z00.00 WELLNESS EXAMINATION: Primary | ICD-10-CM

## 2023-01-25 DIAGNOSIS — E78.2 MIXED HYPERLIPIDEMIA: ICD-10-CM

## 2023-01-25 DIAGNOSIS — R54 FRAILTY: ICD-10-CM

## 2023-01-25 DIAGNOSIS — M81.0 OSTEOPOROSIS, UNSPECIFIED OSTEOPOROSIS TYPE, UNSPECIFIED PATHOLOGICAL FRACTURE PRESENCE: ICD-10-CM

## 2023-01-25 DIAGNOSIS — Z86.73 HISTORY OF CVA (CEREBROVASCULAR ACCIDENT): ICD-10-CM

## 2023-01-25 DIAGNOSIS — M19.90 ARTHRITIS: ICD-10-CM

## 2023-01-25 DIAGNOSIS — E55.9 VITAMIN D DEFICIENCY: ICD-10-CM

## 2023-01-25 DIAGNOSIS — K21.9 GASTROESOPHAGEAL REFLUX DISEASE, UNSPECIFIED WHETHER ESOPHAGITIS PRESENT: ICD-10-CM

## 2023-01-25 PROBLEM — J45.901 MILD ASTHMA WITH ACUTE EXACERBATION: Status: ACTIVE | Noted: 2023-01-25

## 2023-01-25 PROBLEM — J45.901 MILD ASTHMA WITH ACUTE EXACERBATION: Chronic | Status: ACTIVE | Noted: 2023-01-25

## 2023-01-25 PROCEDURE — G0439 PR MEDICARE ANNUAL WELLNESS SUBSEQUENT VISIT: ICD-10-PCS | Mod: ,,, | Performed by: INTERNAL MEDICINE

## 2023-01-25 PROCEDURE — G0439 PPPS, SUBSEQ VISIT: HCPCS | Mod: ,,, | Performed by: INTERNAL MEDICINE

## 2023-01-25 RX ORDER — MULTIVIT-MIN/FA/LYCOPEN/LUTEIN .4-300-25
1 TABLET ORAL DAILY
COMMUNITY

## 2023-01-25 RX ORDER — CALCIUM CARBONATE 600 MG
600 TABLET ORAL DAILY
COMMUNITY

## 2023-01-25 RX ORDER — ALBUTEROL SULFATE 90 UG/1
2 AEROSOL, METERED RESPIRATORY (INHALATION) EVERY 6 HOURS PRN
Qty: 1 G | Refills: 11 | Status: SHIPPED | OUTPATIENT
Start: 2023-01-25 | End: 2023-10-19

## 2023-01-25 RX ORDER — ATORVASTATIN CALCIUM 20 MG/1
30 TABLET, FILM COATED ORAL DAILY
COMMUNITY
Start: 2022-11-16

## 2023-01-25 RX ORDER — AZELASTINE 1 MG/ML
1 SPRAY, METERED NASAL DAILY
COMMUNITY
Start: 2022-10-18

## 2023-01-25 RX ORDER — FLUTICASONE PROPIONATE 50 MCG
1 SPRAY, SUSPENSION (ML) NASAL DAILY
COMMUNITY
Start: 2023-01-02

## 2023-01-25 NOTE — PROGRESS NOTES
Rafat Walker MD        PATIENT NAME: Kristi Garvey  : 1940  DATE: 23  MRN: [id number]      Patient Care Team:  Primary Doctor No as PCP - General  Rafat Walker MD       Billing Provider: Rafat Walker MD  Level of Service: [place] ANNUAL WELLNESS SUBSEQUENT VISIT  Patient PCP Information       Provider PCP Type    Primary Doctor No General            Reason for Visit / Chief Complaint: Medicare AWV (Wellness)       Update PCP  Update Chief Complaint         History of Present Illness / Problem Focused Workflow     Kristi Garvey presents to the clinic with Medicare AWV (Wellness)     Kristi is here for annual medical wellness   Her health is good with no problems.      Review of Systems   Review of Systems   Constitutional: Negative.    HENT: Negative.     Eyes: Negative.    Respiratory: Negative.     Cardiovascular: Negative.    Gastrointestinal: Negative.    Endocrine: Negative.    Genitourinary: Negative.    Musculoskeletal: Negative.    Integumentary:  Negative.   Neurological: Negative.    Psychiatric/Behavioral: Negative.        Patient Reported Health Risk Assessment  What is your age?: 80 or older  Are you male or female?: Female  During the past four weeks, how much have you been bothered by emotional problems such as feeling anxious, depressed, irritable, sad, or downhearted and blue?: Not at all  During the past five weeks, has your physical and/or emotional health limited your social activities with family, friends, neighbors, or groups?: Not at all  During the past four weeks, how much bodily pain have you generally had?: Very mild pain  During the past four weeks, was someone available to help if you needed and wanted help?: Yes, as much as I wanted  During the past four weeks, what was the hardest physical activity you could do for at least two minutes?: Heavy  Can you get to places out of walking distance without help?  (For example, can you travel alone  on buses or taxis, or drive your own car?): Yes  Can you go shopping for groceries or clothes without someone's help?: Yes  Can you prepare your own meals?: Yes  Can you do your own housework without help?: Yes  Because of any health problems, do you need the help of another person with your personal care needs such as eating, bathing, dressing, or getting around the house?: No  Can you handle your own money without help?: Yes  During the past four weeks, how would you rate your health in general?: Excellent  How have things been going for you during the past four weeks?: Very well  Are you having difficulties driving your car?: No  Do you always fasten your seat belt when you are in a car?: Yes, usually  How often in the past four weeks have you been bothered by falling or dizzy when standing up?: Never  How often in the past four weeks have you been bothered by trouble eating well?: Never  How often in the past four weeks have you been bothered by teeth or denture problems?: Never  How often in the past four weeks have you been bothered with problems using the telephone?: Never  How often in the past four weeks have you been bothered by tiredness or fatigue?: Never  Have you fallen two or more times in the past year?: No  Are you afraid of falling?: No  Are you a smoker?: No  During the past four weeks, how many drinks of wine, beer, or other alcoholic beverages did you have?: No alcohol at all  Do you exercise for about 20 minutes three or more days a week?: Yes, some of the time  Have you been given any information to help you with hazards in your house that might hurt you?: Yes  Have you been given any information to help you with keeping track of your medications?: Yes  How often do you have trouble taking medicines the way you've been told to take them?: I always take them as prescribed  How confident are you that you can control and manage most of your health problems?: Very confident  What is your race?  (Check all that apply.):     Medical / Social / Family History     Past Medical History:   Diagnosis Date    Arthritis     GERD (gastroesophageal reflux disease)     History of CVA (cerebrovascular accident)     Mixed hyperlipidemia     Osteoporosis     Vitamin D deficiency        Past Surgical History:   Procedure Laterality Date    BLADDER SUSPENSION      BREAST BIOPSY      CATARACT EXTRACTION Bilateral     CHOLECYSTECTOMY      HERNIA REPAIR      HYSTERECTOMY         Social History  Ms. Garvey  reports that she has never smoked. She has never used smokeless tobacco. She reports that she does not currently use alcohol. She reports that she does not use drugs.    Family History  Ms.'s Guru  family history includes Hypertension in her father.        Medications and Allergies     Medications  Outpatient Medications Marked as Taking for the 1/25/23 encounter (Office Visit) with Rafat Walker MD   Medication Sig Dispense Refill    albuterol (VENTOLIN HFA) 90 mcg/actuation inhaler Inhale 2 puffs into the lungs every 6 (six) hours as needed for Wheezing. Rescue 18 g 12    atorvastatin (LIPITOR) 20 MG tablet Take 30 mg by mouth Daily.      azelastine (ASTELIN) 137 mcg (0.1 %) nasal spray 1 spray by Nasal route Daily.      calcium carbonate (CALCIUM 600) 600 mg calcium (1,500 mg) Tab Take 600 mg by mouth once daily.      clopidogreL (PLAVIX) 75 mg tablet TAKE 1 TABLET DAILY (Patient taking differently: Take 75 mg by mouth once daily.) 90 tablet 3    famotidine (PEPCID) 40 MG tablet Take 40 mg by mouth once daily.      fluticasone propionate (FLONASE) 50 mcg/actuation nasal spray 1 spray by Each Nostril route Daily.      multivit-min-FA-lycopen-lutein (CENTRUM SILVER) 0.4 mg-300 mcg- 250 mcg Tab Take 1 tablet by mouth once daily.      SYMBICORT 160-4.5 mcg/actuation HFAA USE 2 INHALATIONS ORALLY   TWICE DAILY 30.6 g 3    ursodioL (ACTIGALL) 300 mg capsule Take 1 capsule by mouth once daily.          Allergies  Review of patient's allergies indicates:   Allergen Reactions    Aspirin Anaphylaxis    Codeine Nausea Only    Sulfa (sulfonamide antibiotics) Nausea Only       Physical Examination     Vitals:    01/25/23 1044   BP: 130/76   Pulse: 73     Physical Exam  Constitutional:       Appearance: Normal appearance.   HENT:      Head: Normocephalic and atraumatic.      Right Ear: Tympanic membrane normal.      Left Ear: Tympanic membrane normal.      Nose: Nose normal.      Mouth/Throat:      Mouth: Mucous membranes are moist.   Eyes:      Extraocular Movements: Extraocular movements intact.      Pupils: Pupils are equal, round, and reactive to light.   Cardiovascular:      Rate and Rhythm: Normal rate and regular rhythm.      Pulses: Normal pulses.   Pulmonary:      Effort: Pulmonary effort is normal.      Breath sounds: Normal breath sounds.   Abdominal:      General: Abdomen is flat. Bowel sounds are normal.      Palpations: Abdomen is soft.   Musculoskeletal:         General: Normal range of motion.      Cervical back: Normal range of motion and neck supple.   Skin:     General: Skin is warm and dry.   Neurological:      General: No focal deficit present.      Mental Status: She is alert and oriented to person, place, and time.   Psychiatric:         Mood and Affect: Mood normal.         Behavior: Behavior normal.        No flowsheet data found.  Fall Risk Assessment - Outpatient 1/25/2023   Mobility Status Ambulatory   Number of falls 0   Identified as fall risk 0           Depression Screening  Over the past two weeks, has the patient felt down, depressed, or hopeless?: No  Over the past two weeks, has the patient felt little interest or pleasure in doing things?: No  Functional Ability/Safety Screening  Does the patient need help with phone, transportation, shopping, preparing meals, housework, laundry, meds, or managing money?: No  Does the patient's home have rugs in the hallway, lack grab bars in  the bathroom, lack handrails on the stairs or have poor lighting?: No  Have you noticed any hearing difficulties?: No  Cognitive Function (Assessed through direct observation with due consideration of information obtained by way of patient reports and/or concerns raised by family, friends, caretakers, or others)    Does the patient repeat questions/statements in the same day?: No  Does the patient have trouble remembering the date, year, and time?: No  Does the patient have difficulty managing finances?: No  Does the patient have a decreased sense of direction?: No    Assessment and Plan (including Health Maintenance)      Problem List  Smart Sets  Document Outside HM   :    Plan:   Wellness examination    History of CVA (cerebrovascular accident)    Gastroesophageal reflux disease, unspecified whether esophagitis present    Osteoporosis, unspecified osteoporosis type, unspecified pathological fracture presence    Vitamin D deficiency    Arthritis    Frailty    Mixed hyperlipidemia    Breast cancer screening by mammogram    Mild intermittent asthma with acute exacerbation       Laboratory is all stable except slight elevation of her calcium level   She will cut down her vitamin-D and calcium to 1 a day instead of 3 a day   Mammogram scheduled   Discussion on medications and her laboratory  Revisit 1 year annual wellness.         Health Maintenance Due   Topic Date Due    Pneumococcal Vaccines (Age 65+) (1 - PCV) Never done    Shingles Vaccine (2 of 3) 12/16/2014       Problem List Items Addressed This Visit          Neuro    History of CVA (cerebrovascular accident) (Chronic)       Pulmonary    Mild asthma with acute exacerbation (Chronic)       Cardiac/Vascular    Mixed hyperlipidemia (Chronic)       Endocrine    Vitamin D deficiency (Chronic)       GI    GERD (gastroesophageal reflux disease) (Chronic)       Orthopedic    Osteoporosis (Chronic)    Arthritis (Chronic)       Other    Frailty (Chronic)     Other  Visit Diagnoses       Wellness examination    -  Primary    Breast cancer screening by mammogram                Health Maintenance Topics with due status: Not Due       Topic Last Completion Date    TETANUS VACCINE 09/17/2020    DEXA Scan 11/12/2020    Lipid Panel 01/18/2023       No future appointments.       Medicare Annual Wellness and Personalized Prevention Plan:   Fall Risk + Home Safety + Hearing Impairment + Depression Screen + Cognitive Impairment Screen + Health Risk Assessment all reviewed.         Advance Care Planning   I attest to discussing Advance Care Planning with patient and/or family member.  Education was provided including the importance of the Health Care Power of , Advance Directives, and/or [place] documentation.  The patient expressed understanding to the importance of this information and discussion.       Opioid Screening: Patient medication list reviewed, patient is not taking prescription opioids. Patient is not using additional opioids than prescribed. Patient is at low risk of substance abuse based on this opioid use history.        Signature:  Rafat Gomez MD  [facility]  [address] [place] [id number]    Date of encounter: 1/25/23    Follow up in about 1 year (around 1/25/2024) for Medicare Wellness. In addition to their scheduled follow up, the patient has also been instructed to follow up on as needed basis.

## 2023-03-01 ENCOUNTER — HOSPITAL ENCOUNTER (OUTPATIENT)
Dept: RADIOLOGY | Facility: HOSPITAL | Age: 83
Discharge: HOME | End: 2023-03-01
Attending: INTERNAL MEDICINE
Payer: MEDICARE

## 2023-03-01 DIAGNOSIS — Z12.31 BREAST CANCER SCREENING BY MAMMOGRAM: ICD-10-CM

## 2023-03-01 PROCEDURE — 77063 MAMMO DIGITAL SCREENING BILAT WITH TOMO: ICD-10-PCS | Mod: 26,,, | Performed by: RADIOLOGY

## 2023-03-01 PROCEDURE — 77067 MAMMO DIGITAL SCREENING BILAT WITH TOMO: ICD-10-PCS | Mod: 26,,, | Performed by: RADIOLOGY

## 2023-03-01 PROCEDURE — 77067 SCR MAMMO BI INCL CAD: CPT | Mod: TC

## 2023-03-01 PROCEDURE — 77067 SCR MAMMO BI INCL CAD: CPT | Mod: 26,,, | Performed by: RADIOLOGY

## 2023-03-01 PROCEDURE — 77063 BREAST TOMOSYNTHESIS BI: CPT | Mod: 26,,, | Performed by: RADIOLOGY

## 2023-06-07 ENCOUNTER — TELEPHONE (OUTPATIENT)
Dept: INTERNAL MEDICINE | Facility: CLINIC | Age: 83
End: 2023-06-07
Payer: MEDICARE

## 2023-06-07 NOTE — TELEPHONE ENCOUNTER
----- Message from Alba Mooney sent at 6/7/2023 10:55 AM CDT -----  .Type:  Needs Medical Advice    Who Called: pt  Symptoms (please be specific):    How long has patient had these symptoms:    Pharmacy name and phone #:    Would the patient rather a call back or a response via [facility]?   Best Call Back Number: [phone]  Additional Information: pt said she has paper to fill out and need help please call her

## 2023-06-22 ENCOUNTER — TELEPHONE (OUTPATIENT)
Dept: INTERNAL MEDICINE | Facility: CLINIC | Age: 83
End: 2023-06-22
Payer: MEDICARE

## 2023-08-30 ENCOUNTER — LAB VISIT (OUTPATIENT)
Dept: LAB | Facility: HOSPITAL | Age: 83
End: 2023-08-30
Attending: INTERNAL MEDICINE
Payer: MEDICARE

## 2023-08-30 DIAGNOSIS — R74.8 ACID PHOSPHATASE ELEVATED: ICD-10-CM

## 2023-08-30 DIAGNOSIS — K75.4 CHRONIC AGGRESSIVE HEPATITIS: Primary | ICD-10-CM

## 2023-08-30 DIAGNOSIS — R12 HEARTBURN: ICD-10-CM

## 2023-08-30 DIAGNOSIS — E78.2 MIXED HYPERLIPIDEMIA: ICD-10-CM

## 2023-08-30 DIAGNOSIS — K21.9 ESOPHAGEAL REFLUX: ICD-10-CM

## 2023-08-30 LAB
ALBUMIN SERPL-MCNC: 4.1 G/DL (ref 3.4–4.8)
ALBUMIN/GLOB SERPL: 1.6 RATIO (ref 1.1–2)
ALP SERPL-CCNC: 78 UNIT/L (ref 40–150)
ALT SERPL-CCNC: 21 UNIT/L (ref 0–55)
AST SERPL-CCNC: 21 UNIT/L (ref 5–34)
BASOPHILS # BLD AUTO: 0.03 X10(3)/MCL
BASOPHILS NFR BLD AUTO: 0.5 %
BILIRUB SERPL-MCNC: 1.6 MG/DL
BUN SERPL-MCNC: 14 MG/DL (ref 9.8–20.1)
CALCIUM SERPL-MCNC: 10 MG/DL (ref 8.4–10.2)
CHLORIDE SERPL-SCNC: 106 MMOL/L (ref 98–107)
CHOLEST SERPL-MCNC: 200 MG/DL
CHOLEST/HDLC SERPL: 4 {RATIO} (ref 0–5)
CO2 SERPL-SCNC: 27 MMOL/L (ref 23–31)
CREAT SERPL-MCNC: 0.71 MG/DL (ref 0.55–1.02)
EOSINOPHIL # BLD AUTO: 0.25 X10(3)/MCL (ref 0–0.9)
EOSINOPHIL NFR BLD AUTO: 3.9 %
ERYTHROCYTE [DISTWIDTH] IN BLOOD BY AUTOMATED COUNT: 12.9 % (ref 11.5–17)
GFR SERPLBLD CREATININE-BSD FMLA CKD-EPI: >60 MLS/MIN/1.73/M2
GGT SERPL-CCNC: 37 U/L (ref 9–36)
GLOBULIN SER-MCNC: 2.6 GM/DL (ref 2.4–3.5)
GLUCOSE SERPL-MCNC: 95 MG/DL (ref 82–115)
HCT VFR BLD AUTO: 49.3 % (ref 37–47)
HDLC SERPL-MCNC: 55 MG/DL (ref 35–60)
HGB BLD-MCNC: 15.9 G/DL (ref 12–16)
IMM GRANULOCYTES # BLD AUTO: 0.02 X10(3)/MCL (ref 0–0.04)
IMM GRANULOCYTES NFR BLD AUTO: 0.3 %
LDLC SERPL CALC-MCNC: 115 MG/DL (ref 50–140)
LYMPHOCYTES # BLD AUTO: 2.77 X10(3)/MCL (ref 0.6–4.6)
LYMPHOCYTES NFR BLD AUTO: 42.7 %
MCH RBC QN AUTO: 30.6 PG (ref 27–31)
MCHC RBC AUTO-ENTMCNC: 32.3 G/DL (ref 33–36)
MCV RBC AUTO: 94.8 FL (ref 80–94)
MONOCYTES # BLD AUTO: 0.36 X10(3)/MCL (ref 0.1–1.3)
MONOCYTES NFR BLD AUTO: 5.5 %
NEUTROPHILS # BLD AUTO: 3.06 X10(3)/MCL (ref 2.1–9.2)
NEUTROPHILS NFR BLD AUTO: 47.1 %
NRBC BLD AUTO-RTO: 0 %
PLATELET # BLD AUTO: 203 X10(3)/MCL (ref 130–400)
PMV BLD AUTO: 10.2 FL (ref 7.4–10.4)
POTASSIUM SERPL-SCNC: 4.5 MMOL/L (ref 3.5–5.1)
PROT SERPL-MCNC: 6.7 GM/DL (ref 5.8–7.6)
RBC # BLD AUTO: 5.2 X10(6)/MCL (ref 4.2–5.4)
SODIUM SERPL-SCNC: 142 MMOL/L (ref 136–145)
TRIGL SERPL-MCNC: 150 MG/DL (ref 37–140)
VLDLC SERPL CALC-MCNC: 30 MG/DL
WBC # SPEC AUTO: 6.49 X10(3)/MCL (ref 4.5–11.5)

## 2023-08-30 PROCEDURE — 36415 COLL VENOUS BLD VENIPUNCTURE: CPT

## 2023-08-30 PROCEDURE — 85025 COMPLETE CBC W/AUTO DIFF WBC: CPT

## 2023-08-30 PROCEDURE — 80053 COMPREHEN METABOLIC PANEL: CPT

## 2023-08-30 PROCEDURE — 82977 ASSAY OF GGT: CPT

## 2023-08-30 PROCEDURE — 80061 LIPID PANEL: CPT

## 2023-10-19 ENCOUNTER — HOSPITAL ENCOUNTER (OUTPATIENT)
Dept: RADIOLOGY | Facility: HOSPITAL | Age: 83
Discharge: HOME | End: 2023-10-19
Attending: INTERNAL MEDICINE
Payer: MEDICARE

## 2023-10-19 ENCOUNTER — TELEPHONE (OUTPATIENT)
Dept: INTERNAL MEDICINE | Facility: CLINIC | Age: 83
End: 2023-10-19

## 2023-10-19 ENCOUNTER — OFFICE VISIT (OUTPATIENT)
Dept: INTERNAL MEDICINE | Facility: CLINIC | Age: 83
End: 2023-10-19
Payer: MEDICARE

## 2023-10-19 VITALS
DIASTOLIC BLOOD PRESSURE: 76 MMHG | SYSTOLIC BLOOD PRESSURE: 130 MMHG | HEIGHT: 63 IN | WEIGHT: 151.88 LBS | OXYGEN SATURATION: 97 % | TEMPERATURE: 98 F | BODY MASS INDEX: 26.91 KG/M2 | HEART RATE: 73 BPM

## 2023-10-19 DIAGNOSIS — R05.9 COUGH, UNSPECIFIED TYPE: ICD-10-CM

## 2023-10-19 DIAGNOSIS — R09.81 SINUS CONGESTION: ICD-10-CM

## 2023-10-19 DIAGNOSIS — R09.89 CHEST CONGESTION: ICD-10-CM

## 2023-10-19 DIAGNOSIS — R05.9 COUGH, UNSPECIFIED TYPE: Primary | ICD-10-CM

## 2023-10-19 DIAGNOSIS — R05.1 ACUTE COUGH: ICD-10-CM

## 2023-10-19 DIAGNOSIS — R06.2 WHEEZING: ICD-10-CM

## 2023-10-19 DIAGNOSIS — R50.9 FEVER, UNSPECIFIED: ICD-10-CM

## 2023-10-19 LAB
FLUAV AG UPPER RESP QL IA.RAPID: NOT DETECTED
FLUBV AG UPPER RESP QL IA.RAPID: NOT DETECTED
RSV A 5' UTR RNA NPH QL NAA+PROBE: NOT DETECTED
SARS-COV-2 RNA RESP QL NAA+PROBE: NOT DETECTED

## 2023-10-19 PROCEDURE — 71046 X-RAY EXAM CHEST 2 VIEWS: CPT | Mod: TC

## 2023-10-19 PROCEDURE — 99214 PR OFFICE/OUTPT VISIT, EST, LEVL IV, 30-39 MIN: ICD-10-PCS | Mod: ,,, | Performed by: INTERNAL MEDICINE

## 2023-10-19 PROCEDURE — 99214 OFFICE O/P EST MOD 30 MIN: CPT | Mod: ,,, | Performed by: INTERNAL MEDICINE

## 2023-10-19 PROCEDURE — 0241U COVID/RSV/FLU A&B PCR: CPT | Performed by: INTERNAL MEDICINE

## 2023-10-19 RX ORDER — ALBUTEROL SULFATE 0.83 MG/ML
2.5 SOLUTION RESPIRATORY (INHALATION) EVERY 6 HOURS PRN
Qty: 28 EACH | Refills: 11 | Status: SHIPPED | OUTPATIENT
Start: 2023-10-19 | End: 2024-10-18

## 2023-10-19 RX ORDER — BENZONATATE 200 MG/1
200 CAPSULE ORAL 3 TIMES DAILY PRN
Qty: 30 CAPSULE | Refills: 6 | Status: SHIPPED | OUTPATIENT
Start: 2023-10-19 | End: 2023-12-28

## 2023-10-19 NOTE — PROGRESS NOTES
Rafat Gomez MD        PATIENT NAME: Kristi Garvey  : 1940  DATE: 10/19/23  MRN: [id number]      Billing Provider: Rafat Gomez MD  Level of Service: [place] OFFICE/OUTPT VISIT, EST, LEVL IV, 30-39 MIN  Patient PCP Information       Provider PCP Type    Rafat Gomez MD General            Reason for Visit / Chief Complaint: Cough (Cough and congestion)       Update PCP  Update Chief Complaint         History of Present Illness / Problem Focused Workflow     Kristi Garvey presents to the clinic with Cough (Cough and congestion)     Kristi comes in today with an emergency work-in   With a past few days she is developed significant cough wheezing shortness of breath   Low-grade fever no nausea and vomiting   A family member of hers has RSV.        Review of Systems   Review of Systems   Constitutional: Negative.    HENT: Negative.     Eyes: Negative.    Respiratory:  Positive for cough and wheezing.    Cardiovascular: Negative.    Gastrointestinal: Negative.    Endocrine: Negative.    Genitourinary: Negative.    Musculoskeletal: Negative.    Integumentary:  Negative.   Neurological: Negative.    Psychiatric/Behavioral: Negative.          Medical / Social / Family History     Past Medical History:   Diagnosis Date    Arthritis     GERD (gastroesophageal reflux disease)     History of CVA (cerebrovascular accident)     Mixed hyperlipidemia     Osteoporosis     Vitamin D deficiency        Past Surgical History:   Procedure Laterality Date    BLADDER SUSPENSION      BREAST BIOPSY      CATARACT EXTRACTION Bilateral     CHOLECYSTECTOMY      HERNIA REPAIR      HYSTERECTOMY         Social History  Ms. Garvey  reports that she has never smoked. She has never used smokeless tobacco. She reports that she does not currently use alcohol. She reports that she does not use drugs.    Family History  Ms.'s Garvey  family history includes Hypertension in her father.    Medications and Allergies      Medications  Outpatient Medications Marked as Taking for the 10/19/23 encounter (Office Visit) with Rafat Walker MD   Medication Sig Dispense Refill    albuterol (VENTOLIN HFA) 90 mcg/actuation inhaler Inhale 2 puffs into the lungs every 6 (six) hours as needed for Wheezing. Rescue 18 g 12    atorvastatin (LIPITOR) 20 MG tablet Take 30 mg by mouth Daily.      azelastine (ASTELIN) 137 mcg (0.1 %) nasal spray 1 spray by Nasal route Daily.      calcium carbonate (CALCIUM 600) 600 mg calcium (1,500 mg) Tab Take 600 mg by mouth once daily.      clopidogreL (PLAVIX) 75 mg tablet TAKE 1 TABLET DAILY (Patient taking differently: Take 75 mg by mouth once daily.) 90 tablet 3    famotidine (PEPCID) 40 MG tablet Take 40 mg by mouth once daily.      fluticasone propionate (FLONASE) 50 mcg/actuation nasal spray 1 spray by Each Nostril route Daily.      multivit-min-FA-lycopen-lutein (CENTRUM SILVER) 0.4 mg-300 mcg- 250 mcg Tab Take 1 tablet by mouth once daily.      SYMBICORT 160-4.5 mcg/actuation HFAA USE 2 INHALATIONS ORALLY   TWICE DAILY 30.6 g 3    ursodioL (ACTIGALL) 300 mg capsule Take 1 capsule by mouth once daily.      [DISCONTINUED] albuterol (VENTOLIN HFA) 90 mcg/actuation inhaler Inhale 2 puffs into the lungs every 6 (six) hours as needed for Wheezing. Rescue 1 g 11       Allergies  Review of patient's allergies indicates:   Allergen Reactions    Aspirin Anaphylaxis    Codeine Nausea Only    Sulfa (sulfonamide antibiotics) Nausea Only       Physical Examination     Vitals:    10/19/23 0851   BP: 130/76   Pulse: 73   Temp: 97.8 °F (36.6 °C)     Physical Exam  Constitutional:       Appearance: Normal appearance.   HENT:      Head: Normocephalic and atraumatic.      Right Ear: Tympanic membrane normal.      Left Ear: Tympanic membrane normal.      Nose: Nose normal.      Mouth/Throat:      Mouth: Mucous membranes are moist.   Eyes:      Extraocular Movements: Extraocular movements intact.      Pupils: Pupils  are equal, round, and reactive to light.   Cardiovascular:      Rate and Rhythm: Normal rate and regular rhythm.      Pulses: Normal pulses.   Pulmonary:      Effort: Pulmonary effort is normal.      Breath sounds: Examination of the right-upper field reveals wheezing. Examination of the left-upper field reveals wheezing. Examination of the right-middle field reveals wheezing. Examination of the left-middle field reveals wheezing. Examination of the right-lower field reveals wheezing. Examination of the left-lower field reveals wheezing. Wheezing present.   Abdominal:      General: Abdomen is flat. Bowel sounds are normal.      Palpations: Abdomen is soft.   Musculoskeletal:         General: Normal range of motion.      Cervical back: Normal range of motion and neck supple.   Skin:     General: Skin is warm and dry.   Neurological:      General: No focal deficit present.      Mental Status: She is alert and oriented to person, place, and time.   Psychiatric:         Mood and Affect: Mood normal.         Behavior: Behavior normal.          Assessment and Plan (including Health Maintenance)      Problem List  Smart Sets  Document Outside HM   :    Plan:    ICD-10-CM ICD-9-CM   1. Cough, unspecified type  R05.9 786.2   2. Chest congestion  R09.89 786.9   3. Fever, unspecified  R50.9 780.60   4. Acute cough  R05.1 786.2   5. Sinus congestion  R09.81 478.19       Problem List Items Addressed This Visit          ENT    Sinus congestion     Symptomatic treatment for this with over-the-counter medicine.  Continue Astelin spray            Pulmonary    Acute cough     We swabbed this morning in the office for RSV COVID and flu   Chest x-ray ordered   Begin nebulization treatments with albuterol inhaler 4 times a day   Begin Tessalon for cough   Months the results her in appropriate treatment will be added  If she continues wheezing steroids will be added          Other Visit Diagnoses       Cough, unspecified type    -   Primary    Relevant Orders    COVID/RSV/FLU A&B PCR    Chest congestion        Relevant Orders    COVID/RSV/FLU A&B PCR    Fever, unspecified        Relevant Orders    COVID/RSV/FLU A&B PCR            Orders Placed This Encounter    COVID/RSV/FLU A&B PCR         Health Maintenance Due   Topic Date Due    Pneumococcal Vaccines (Age 65+) (1 - PCV) Never done    Shingles Vaccine (2 of 3) 12/16/2014       Problem List Items Addressed This Visit          ENT    Sinus congestion    Current Assessment & Plan     Symptomatic treatment for this with over-the-counter medicine.  Continue Astelin spray            Pulmonary    Acute cough    Current Assessment & Plan     We swabbed this morning in the office for RSV COVID and flu   Chest x-ray ordered   Begin nebulization treatments with albuterol inhaler 4 times a day   Begin Tessalon for cough   Months the results her in appropriate treatment will be added  If she continues wheezing steroids will be added          Other Visit Diagnoses       Cough, unspecified type    -  Primary    Relevant Orders    COVID/RSV/FLU A&B PCR    Chest congestion        Relevant Orders    COVID/RSV/FLU A&B PCR    Fever, unspecified        Relevant Orders    COVID/RSV/FLU A&B PCR            Health Maintenance Topics with due status: Not Due       Topic Last Completion Date    TETANUS VACCINE 09/17/2020    DEXA Scan 11/12/2020    Lipid Panel 08/30/2023       Future Appointments   Date Time Provider Department Center   2/21/2024  9:20 AM Rafat Walker MD [place] [id number]            Signature:  Rafat Walker MD  [facility]  [address] [place] [phone]    Date of encounter: 10/19/23

## 2023-10-19 NOTE — ASSESSMENT & PLAN NOTE
We swabbed this morning in the office for RSV COVID and flu   Chest x-ray ordered   Begin nebulization treatments with albuterol inhaler 4 times a day   Begin Tessalon for cough   Months the results her in appropriate treatment will be added  If she continues wheezing steroids will be added

## 2023-10-20 ENCOUNTER — TELEPHONE (OUTPATIENT)
Dept: INTERNAL MEDICINE | Facility: CLINIC | Age: 83
End: 2023-10-20
Payer: MEDICARE

## 2023-10-20 NOTE — TELEPHONE ENCOUNTER
Spoke with pt and stated to her that [facility] is currently working on her prescription for her nebulizer machine and supplies.

## 2023-10-20 NOTE — TELEPHONE ENCOUNTER
----- Message from Felix Hodges sent at 10/20/2023  8:43 AM CDT -----  .Type:  Needs Medical Advice    Who Called: Kristi   Symptoms (please be specific):    How long has patient had these symptoms:    Pharmacy name and phone #:    Would the patient rather a call back or a response via [facility]?   Best Call Back Number: [phone]  Additional Information: Patient requested a call back from nurse, re: her visit yesterday with the doctor, she was still waiting for her nebulizer from [place]. Please call her back today.

## 2023-10-22 ENCOUNTER — TELEPHONE (OUTPATIENT)
Dept: INTERNAL MEDICINE | Facility: CLINIC | Age: 83
End: 2023-10-22
Payer: MEDICARE

## 2023-10-22 NOTE — TELEPHONE ENCOUNTER
Flu Covid and RSV negative  Please call her and see how she is doing and if she needs anything else

## 2023-10-23 NOTE — TELEPHONE ENCOUNTER
If not already ordered please add Mucinex to take twice daily for her congestion.    Find out if she is running fever has any other signs of infection and I will add an antibiotic.

## 2023-10-23 NOTE — TELEPHONE ENCOUNTER
Pt is stating that shes currently taking mucinex twice daily, but shes not running any fever. She is coughing up a yellowish mucus she states feels like that just needed to come up.She stated she will continue mucinex and inhaler to continue helping her.

## 2023-10-23 NOTE — TELEPHONE ENCOUNTER
Pt aware of results and voiced understanding, also pt stated she is coughing up a lot of mucous but she is taking treatments twice a day.

## 2023-10-29 DIAGNOSIS — I51.9 HEART DISEASE: ICD-10-CM

## 2023-10-30 RX ORDER — CLOPIDOGREL BISULFATE 75 MG/1
TABLET ORAL
Qty: 90 TABLET | Refills: 3 | Status: SHIPPED | OUTPATIENT
Start: 2023-10-30

## 2023-11-27 DIAGNOSIS — R06.02 SOB (SHORTNESS OF BREATH): ICD-10-CM

## 2023-11-27 RX ORDER — BUDESONIDE AND FORMOTEROL FUMARATE DIHYDRATE 160; 4.5 UG/1; UG/1
AEROSOL RESPIRATORY (INHALATION)
Qty: 30.6 G | Refills: 3 | Status: SHIPPED | OUTPATIENT
Start: 2023-11-27 | End: 2024-03-13

## 2024-02-07 ENCOUNTER — TELEPHONE (OUTPATIENT)
Dept: INTERNAL MEDICINE | Facility: CLINIC | Age: 84
End: 2024-02-07
Payer: MEDICARE

## 2024-02-07 NOTE — TELEPHONE ENCOUNTER
----- Message from Yenny Anthony sent at 2/7/2024  2:32 PM CST -----  Regarding: Lab Orders  .Caller is requesting to schedule their Lab appointment prior to annual appointment.  Order is not listed in EPIC.  Please enter order and contact patient to schedule.    Name of Caller:pt    Preferred Date and Time of Labs:02/09    Date of EPP Appointment:02/21    Where would they like the lab performed?[facility]    Would the patient rather a call back or a response via [facility]?     Best Call Back Number:[phone]    Additional Information:pt is requesting to have her lab orders placed or sent to [facility] for her appointment that is scheduled for 02/09, please advise

## 2024-02-09 ENCOUNTER — LAB VISIT (OUTPATIENT)
Dept: LAB | Facility: HOSPITAL | Age: 84
End: 2024-02-09
Attending: INTERNAL MEDICINE
Payer: MEDICARE

## 2024-02-09 DIAGNOSIS — E78.2 MIXED HYPERLIPIDEMIA: ICD-10-CM

## 2024-02-09 DIAGNOSIS — E55.9 VITAMIN D DEFICIENCY: ICD-10-CM

## 2024-02-09 LAB
ALBUMIN SERPL-MCNC: 4 G/DL (ref 3.4–4.8)
ALBUMIN/GLOB SERPL: 1.5 RATIO (ref 1.1–2)
ALP SERPL-CCNC: 92 UNIT/L (ref 40–150)
ALT SERPL-CCNC: 21 UNIT/L (ref 0–55)
APPEARANCE UR: CLEAR
AST SERPL-CCNC: 19 UNIT/L (ref 5–34)
BACTERIA #/AREA URNS AUTO: ABNORMAL /HPF
BASOPHILS # BLD AUTO: 0.03 X10(3)/MCL
BASOPHILS NFR BLD AUTO: 0.5 %
BILIRUB SERPL-MCNC: 1 MG/DL
BILIRUB UR QL STRIP.AUTO: NEGATIVE
BUN SERPL-MCNC: 16.6 MG/DL (ref 9.8–20.1)
CALCIUM SERPL-MCNC: 10.3 MG/DL (ref 8.4–10.2)
CHLORIDE SERPL-SCNC: 107 MMOL/L (ref 98–107)
CHOLEST SERPL-MCNC: 204 MG/DL
CHOLEST/HDLC SERPL: 4 {RATIO} (ref 0–5)
CO2 SERPL-SCNC: 32 MMOL/L (ref 23–31)
COLOR UR AUTO: ABNORMAL
CREAT SERPL-MCNC: 0.74 MG/DL (ref 0.55–1.02)
DEPRECATED CALCIDIOL+CALCIFEROL SERPL-MC: 66.7 NG/ML (ref 30–80)
EOSINOPHIL # BLD AUTO: 0.25 X10(3)/MCL (ref 0–0.9)
EOSINOPHIL NFR BLD AUTO: 4.2 %
ERYTHROCYTE [DISTWIDTH] IN BLOOD BY AUTOMATED COUNT: 12.8 % (ref 11.5–17)
GFR SERPLBLD CREATININE-BSD FMLA CKD-EPI: >60 MLS/MIN/1.73/M2
GLOBULIN SER-MCNC: 2.6 GM/DL (ref 2.4–3.5)
GLUCOSE SERPL-MCNC: 89 MG/DL (ref 82–115)
GLUCOSE UR QL STRIP.AUTO: NORMAL
HCT VFR BLD AUTO: 48.7 % (ref 37–47)
HDLC SERPL-MCNC: 58 MG/DL (ref 35–60)
HGB BLD-MCNC: 15.5 G/DL (ref 12–16)
IMM GRANULOCYTES # BLD AUTO: 0.02 X10(3)/MCL (ref 0–0.04)
IMM GRANULOCYTES NFR BLD AUTO: 0.3 %
KETONES UR QL STRIP.AUTO: NEGATIVE
LDLC SERPL CALC-MCNC: 120 MG/DL (ref 50–140)
LEUKOCYTE ESTERASE UR QL STRIP.AUTO: 75
LYMPHOCYTES # BLD AUTO: 2.6 X10(3)/MCL (ref 0.6–4.6)
LYMPHOCYTES NFR BLD AUTO: 44.1 %
MCH RBC QN AUTO: 30.5 PG (ref 27–31)
MCHC RBC AUTO-ENTMCNC: 31.8 G/DL (ref 33–36)
MCV RBC AUTO: 95.7 FL (ref 80–94)
MONOCYTES # BLD AUTO: 0.35 X10(3)/MCL (ref 0.1–1.3)
MONOCYTES NFR BLD AUTO: 5.9 %
NEUTROPHILS # BLD AUTO: 2.64 X10(3)/MCL (ref 2.1–9.2)
NEUTROPHILS NFR BLD AUTO: 45 %
NITRITE UR QL STRIP.AUTO: NEGATIVE
NRBC BLD AUTO-RTO: 0 %
PH UR STRIP.AUTO: 7.5 [PH]
PLATELET # BLD AUTO: 207 X10(3)/MCL (ref 130–400)
PMV BLD AUTO: 9.6 FL (ref 7.4–10.4)
POTASSIUM SERPL-SCNC: 4.2 MMOL/L (ref 3.5–5.1)
PROT SERPL-MCNC: 6.6 GM/DL (ref 5.8–7.6)
PROT UR QL STRIP.AUTO: NEGATIVE
RBC # BLD AUTO: 5.09 X10(6)/MCL (ref 4.2–5.4)
RBC #/AREA URNS AUTO: ABNORMAL /HPF
RBC UR QL AUTO: NEGATIVE
SODIUM SERPL-SCNC: 142 MMOL/L (ref 136–145)
SP GR UR STRIP.AUTO: 1.01 (ref 1–1.03)
SQUAMOUS #/AREA URNS LPF: ABNORMAL /HPF
TRIGL SERPL-MCNC: 132 MG/DL (ref 37–140)
UROBILINOGEN UR STRIP-ACNC: NORMAL
VLDLC SERPL CALC-MCNC: 26 MG/DL
WBC # SPEC AUTO: 5.89 X10(3)/MCL (ref 4.5–11.5)
WBC #/AREA URNS AUTO: ABNORMAL /HPF

## 2024-02-09 PROCEDURE — 36415 COLL VENOUS BLD VENIPUNCTURE: CPT

## 2024-02-09 PROCEDURE — 82306 VITAMIN D 25 HYDROXY: CPT

## 2024-02-09 PROCEDURE — 85025 COMPLETE CBC W/AUTO DIFF WBC: CPT

## 2024-02-09 PROCEDURE — 81001 URINALYSIS AUTO W/SCOPE: CPT

## 2024-02-09 PROCEDURE — 80061 LIPID PANEL: CPT

## 2024-02-09 PROCEDURE — 80053 COMPREHEN METABOLIC PANEL: CPT

## 2024-02-15 ENCOUNTER — TELEPHONE (OUTPATIENT)
Dept: INTERNAL MEDICINE | Facility: CLINIC | Age: 84
End: 2024-02-15
Payer: MEDICARE

## 2024-02-20 PROBLEM — Z00.00 ENCOUNTER FOR MEDICARE ANNUAL WELLNESS EXAM: Status: ACTIVE | Noted: 2024-02-20

## 2024-02-20 PROBLEM — K75.4 AUTOIMMUNE HEPATITIS: Status: ACTIVE | Noted: 2024-02-20

## 2024-02-20 PROBLEM — K75.4 AUTOIMMUNE HEPATITIS: Chronic | Status: ACTIVE | Noted: 2024-02-20

## 2024-02-21 ENCOUNTER — OFFICE VISIT (OUTPATIENT)
Dept: INTERNAL MEDICINE | Facility: CLINIC | Age: 84
End: 2024-02-21
Payer: MEDICARE

## 2024-02-21 VITALS
BODY MASS INDEX: 28.52 KG/M2 | HEART RATE: 71 BPM | DIASTOLIC BLOOD PRESSURE: 80 MMHG | OXYGEN SATURATION: 97 % | WEIGHT: 155 LBS | SYSTOLIC BLOOD PRESSURE: 130 MMHG | HEIGHT: 62 IN

## 2024-02-21 DIAGNOSIS — K11.20 PAROTIDITIS: ICD-10-CM

## 2024-02-21 DIAGNOSIS — K75.4 AUTOIMMUNE HEPATITIS: ICD-10-CM

## 2024-02-21 DIAGNOSIS — Z00.00 ENCOUNTER FOR MEDICARE ANNUAL WELLNESS EXAM: Primary | ICD-10-CM

## 2024-02-21 DIAGNOSIS — I65.21 STENOSIS OF RIGHT CAROTID ARTERY: ICD-10-CM

## 2024-02-21 DIAGNOSIS — E78.2 MIXED HYPERLIPIDEMIA: Chronic | ICD-10-CM

## 2024-02-21 DIAGNOSIS — K21.9 GASTROESOPHAGEAL REFLUX DISEASE WITHOUT ESOPHAGITIS: Chronic | ICD-10-CM

## 2024-02-21 DIAGNOSIS — Z12.31 BREAST CANCER SCREENING BY MAMMOGRAM: ICD-10-CM

## 2024-02-21 PROCEDURE — G0439 PPPS, SUBSEQ VISIT: HCPCS | Mod: ,,, | Performed by: INTERNAL MEDICINE

## 2024-02-21 RX ORDER — AMOXICILLIN AND CLAVULANATE POTASSIUM 875; 125 MG/1; MG/1
1 TABLET, FILM COATED ORAL 2 TIMES DAILY
Qty: 10 TABLET | Refills: 0 | Status: SHIPPED | OUTPATIENT
Start: 2024-02-21 | End: 2024-03-13

## 2024-02-21 RX ORDER — MILK THISTLE 500 MG
1 CAPSULE ORAL DAILY
COMMUNITY

## 2024-02-21 RX ORDER — PANTOPRAZOLE SODIUM 40 MG/1
40 TABLET, DELAYED RELEASE ORAL DAILY
COMMUNITY

## 2024-02-21 NOTE — ASSESSMENT & PLAN NOTE
Discussed results of lab and examination  Carotid ultrasound ordered for evaluation of her right carotid bruit   Mammogram ordered  She will return in 1 month for follow-up

## 2024-02-21 NOTE — ASSESSMENT & PLAN NOTE
Begin Augmentin twice a day for 5 days   Bite lemon wedges 4 times a day for 1 week  Follow-up one-month

## 2024-02-21 NOTE — PROGRESS NOTES
Rafat Walker MD        PATIENT NAME: Kristi Garvey  : 1940  DATE: 24  MRN: [id number]      Patient Care Team:  Rafat Walker MD as PCP - General (Internal Medicine)  Rafat Walker MD       Billing Provider: Rafat Walker MD  Level of Service: [place] MEDIC[place] ANNUAL WELLNESS SUBSEQUENT VISIT  Patient PCP Information       Provider PCP Type    Rafat Walker MD General            Reason for Visit / Chief Complaint: Medicare AWV       Update PCP  Update Chief Complaint         History of Present Illness / Problem Focused Workflow     Kristi Garvey presents to the clinic with Medicare AWV     Patient is here for annual Medicare wellness exam   Overall she is in good health has a few issues  1. She is got swelling in her right jaw area around a parotid gland for 2 days   It has not painful and no fever   needs her mammogram scheduled   feeling much better with her breathing since on treatment for GERD  She is concerned that she hears her pulse in her right ear that she may have a carotid blockage        Review of Systems   Review of Systems   Constitutional: Negative.    HENT: Negative.     Eyes: Negative.    Respiratory: Negative.     Cardiovascular: Negative.    Gastrointestinal: Negative.    Endocrine: Negative.    Genitourinary: Negative.    Musculoskeletal: Negative.    Integumentary:  Negative.   Neurological: Negative.    Psychiatric/Behavioral: Negative.          Patient Reported Health Risk Assessment       Medical / Social / Family History     Past Medical History:   Diagnosis Date    Arthritis     GERD (gastroesophageal reflux disease)     History of CVA (cerebrovascular accident)     Mixed hyperlipidemia     Osteoporosis     Vitamin D deficiency        Past Surgical History:   Procedure Laterality Date    BLADDER SUSPENSION      BREAST BIOPSY      CATARACT EXTRACTION Bilateral     CHOLECYSTECTOMY      HERNIA REPAIR      HYSTERECTOMY         Social History  Ms.  Guru  reports that she has never smoked. She has never used smokeless tobacco. She reports that she does not currently use alcohol. She reports that she does not use drugs.    Family History  Ms.'s Guru  family history includes Hypertension in her father.        Medications and Allergies     Medications  Outpatient Medications Marked as Taking for the 2/21/24 encounter (Office Visit) with Rafat Walker MD   Medication Sig Dispense Refill    albuterol (PROVENTIL) 2.5 mg /3 mL (0.083 %) nebulizer solution Take 3 mLs (2.5 mg total) by nebulization every 6 (six) hours as needed for Wheezing. Rescue 28 each 11    albuterol (VENTOLIN HFA) 90 mcg/actuation inhaler Inhale 2 puffs into the lungs every 6 (six) hours as needed for Wheezing. Rescue 18 g 12    atorvastatin (LIPITOR) 20 MG tablet Take 30 mg by mouth Daily.      azelastine (ASTELIN) 137 mcg (0.1 %) nasal spray 1 spray by Nasal route Daily.      calcium carbonate (CALCIUM 600) 600 mg calcium (1,500 mg) Tab Take 600 mg by mouth once daily.      clopidogreL (PLAVIX) 75 mg tablet TAKE 1 TABLET DAILY 90 tablet 3    famotidine (PEPCID) 40 MG tablet Take 40 mg by mouth once daily.      fluticasone propionate (FLONASE) 50 mcg/actuation nasal spray 1 spray by Each Nostril route Daily.      milk thistle 500 mg Cap Take 1 Capful by mouth once daily.      multivit-min-FA-lycopen-lutein (CENTRUM SILVER) 0.4 mg-300 mcg- 250 mcg Tab Take 1 tablet by mouth once daily.      pantoprazole (PROTONIX) 40 MG tablet Take 40 mg by mouth once daily.      SYMBICORT 160-4.5 mcg/actuation HFAA USE 2 INHALATIONS ORALLY   TWICE DAILY 30.6 g 3    ursodioL (ACTIGALL) 300 mg capsule Take 1 capsule by mouth once daily.         Allergies  Review of patient's allergies indicates:   Allergen Reactions    Aspirin Anaphylaxis    Codeine Nausea Only    Sulfa (sulfonamide antibiotics) Nausea Only       Physical Examination     Vitals:    02/21/24 0930   BP: 130/80   Pulse: 71     Physical  Exam  Constitutional:       Appearance: Normal appearance.   HENT:      Head: Normocephalic and atraumatic.        Comments: Right parotid gland swollen and nontender  There is no redness or heat.     Right Ear: Tympanic membrane normal.      Left Ear: Tympanic membrane normal.      Nose: Nose normal.      Mouth/Throat:      Mouth: Mucous membranes are moist.   Eyes:      Extraocular Movements: Extraocular movements intact.      Pupils: Pupils are equal, round, and reactive to light.   Cardiovascular:      Rate and Rhythm: Normal rate and regular rhythm.      Pulses: Normal pulses.   Pulmonary:      Effort: Pulmonary effort is normal.      Breath sounds: Normal breath sounds.   Abdominal:      General: Abdomen is flat. Bowel sounds are normal.      Palpations: Abdomen is soft.   Musculoskeletal:         General: Normal range of motion.      Cervical back: Normal range of motion and neck supple.   Skin:     General: Skin is warm and dry.   Neurological:      General: No focal deficit present.      Mental Status: She is alert and oriented to person, place, and time.   Psychiatric:         Mood and Affect: Mood normal.         Behavior: Behavior normal.               No data to display                  2/21/2024     9:20 AM 10/19/2023     9:00 AM 1/25/2023    11:00 AM   Fall Risk Assessment - Outpatient   Mobility Status Ambulatory Ambulatory Ambulatory   Number of falls 1 with injury 0 0   Identified as fall risk True False False                Assessment and Plan (including Health Maintenance)      Problem List  Smart Sets  Document Outside HM   :    Plan:       ICD-10-CM ICD-9-CM   1. Encounter for Medicare annual wellness exam  Z00.00 V70.0   2. Autoimmune hepatitis  K75.4 571.42   3. Mixed hyperlipidemia  E78.2 272.2   4. Breast cancer screening by mammogram  Z12.31 V76.12   5. Gastroesophageal reflux disease without esophagitis  K21.9 530.81   6. Parotiditis  K11.20 527.2   7. Stenosis of right carotid artery   I65.21 433.10               Health Maintenance Due   Topic Date Due    Pneumococcal Vaccines (Age 65+) (1 of 2 - PCV) Never done    Shingles Vaccine (2 of 3) 12/16/2014       Problem List Items Addressed This Visit          ENT    Parotiditis    Current Assessment & Plan     Begin Augmentin twice a day for 5 days   Bite lemon wedges 4 times a day for 1 week  Follow-up one-month            Cardiac/Vascular    Mixed hyperlipidemia (Chronic)    Current Assessment & Plan     Lipid level stable            GI    GERD (gastroesophageal reflux disease) (Chronic)    Current Assessment & Plan     Much improved on reflux therapy         Autoimmune hepatitis (Chronic)    Current Assessment & Plan     Liver function stable            Other    Encounter for Medicare annual wellness exam - Primary    Current Assessment & Plan     Discussed results of lab and examination  Carotid ultrasound ordered for evaluation of her right carotid bruit   Mammogram ordered  She will return in 1 month for follow-up          Other Visit Diagnoses       Breast cancer screening by mammogram        Stenosis of right carotid artery                Health Maintenance Topics with due status: Not Due       Topic Last Completion Date    TETANUS VACCINE 09/17/2020    DEXA Scan 11/12/2020    Lipid Panel 02/09/2024       Future Appointments   Date Time Provider Department Center   3/13/2024  9:20 AM Rafat Walker MD [facility] [id number] Medicare Annual Wellness and Personalized Prevention Plan:   Fall Risk + Home Safety + Hearing Impairment + Depression Screen + Cognitive Impairment Screen + Health Risk Assessment all reviewed.         Advance Care Planning   I attest to discussing Advance Care Planning with patient and/or family member.  Education was provided including the importance of the Health Care Power of , Advance Directives, and/or [place] documentation.  The patient expressed understanding to the importance of this  information and discussion.       Opioid Screening: Patient medication list reviewed, patient is not taking prescription opioids. Patient is not using additional opioids than prescribed. Patient is at low risk of substance abuse based on this opioid use history.        Signature:  Rafat Gomez MD  [facility]  [address] [place], [place] [address] [address]    Date of encounter: 2/21/24    Follow up in about 3 weeks (around 3/13/2024). In addition to their scheduled follow up, the patient has also been instructed to follow up on as needed basis.

## 2024-03-04 ENCOUNTER — HOSPITAL ENCOUNTER (OUTPATIENT)
Dept: RADIOLOGY | Facility: HOSPITAL | Age: 84
Discharge: HOME | End: 2024-03-04
Attending: INTERNAL MEDICINE
Payer: MEDICARE

## 2024-03-04 DIAGNOSIS — Z12.31 BREAST CANCER SCREENING BY MAMMOGRAM: ICD-10-CM

## 2024-03-04 DIAGNOSIS — I65.21 STENOSIS OF RIGHT CAROTID ARTERY: ICD-10-CM

## 2024-03-04 PROCEDURE — 77067 SCR MAMMO BI INCL CAD: CPT | Mod: TC

## 2024-03-04 PROCEDURE — 93880 EXTRACRANIAL BILAT STUDY: CPT | Mod: TC

## 2024-03-04 PROCEDURE — 77063 BREAST TOMOSYNTHESIS BI: CPT | Mod: 26,,, | Performed by: STUDENT IN AN ORGANIZED HEALTH CARE EDUCATION/TRAINING PROGRAM

## 2024-03-04 PROCEDURE — 77067 SCR MAMMO BI INCL CAD: CPT | Mod: 26,,, | Performed by: STUDENT IN AN ORGANIZED HEALTH CARE EDUCATION/TRAINING PROGRAM

## 2024-03-05 ENCOUNTER — TELEPHONE (OUTPATIENT)
Dept: INTERNAL MEDICINE | Facility: CLINIC | Age: 84
End: 2024-03-05
Payer: MEDICARE

## 2024-03-13 ENCOUNTER — OFFICE VISIT (OUTPATIENT)
Dept: INTERNAL MEDICINE | Facility: CLINIC | Age: 84
End: 2024-03-13
Payer: MEDICARE

## 2024-03-13 VITALS
BODY MASS INDEX: 28.41 KG/M2 | HEART RATE: 64 BPM | OXYGEN SATURATION: 99 % | HEIGHT: 62 IN | DIASTOLIC BLOOD PRESSURE: 68 MMHG | WEIGHT: 154.38 LBS | SYSTOLIC BLOOD PRESSURE: 124 MMHG

## 2024-03-13 DIAGNOSIS — K11.20 PAROTIDITIS: Primary | ICD-10-CM

## 2024-03-13 DIAGNOSIS — J45.21 MILD INTERMITTENT ASTHMA WITH ACUTE EXACERBATION: Primary | Chronic | ICD-10-CM

## 2024-03-13 DIAGNOSIS — Z86.73 HISTORY OF CVA (CEREBROVASCULAR ACCIDENT): Chronic | ICD-10-CM

## 2024-03-13 PROCEDURE — 99213 OFFICE O/P EST LOW 20 MIN: CPT | Mod: ,,, | Performed by: INTERNAL MEDICINE

## 2024-03-13 RX ORDER — FLUTICASONE PROPIONATE AND SALMETEROL 100; 50 UG/1; UG/1
1 POWDER RESPIRATORY (INHALATION) 2 TIMES DAILY
COMMUNITY
End: 2024-03-13 | Stop reason: SDUPTHER

## 2024-03-13 RX ORDER — FLUTICASONE PROPIONATE AND SALMETEROL 100; 50 UG/1; UG/1
1 POWDER RESPIRATORY (INHALATION) 2 TIMES DAILY
Qty: 60 EACH | Refills: 0 | Status: SHIPPED | OUTPATIENT
Start: 2024-03-13 | End: 2024-04-09 | Stop reason: SDUPTHER

## 2024-03-13 RX ORDER — CHOLECALCIFEROL (VITAMIN D3) 25 MCG
TABLET ORAL
COMMUNITY

## 2024-03-13 NOTE — ASSESSMENT & PLAN NOTE
Carotid ultrasound normal   The soft sound she hears in her ears probably due to a tortuous carotid artery and no obstruction

## 2024-03-13 NOTE — PROGRESS NOTES
Rafat Gomez MD        PATIENT NAME: Kristi Garvey  : 1940  DATE: 3/13/24  MRN: [id number]      Billing Provider: Rafat Gomez MD  Level of Service: [place] OFFICE/OUTPT VISIT, EST, LEVL III, 20-29 MIN  Patient PCP Information       Provider PCP Type    Rafat Gomez MD General            Reason for Visit / Chief Complaint: Follow-up (3 wk f/u/)       Update PCP  Update Chief Complaint         History of Present Illness / Problem Focused Workflow     Kristi Garvey presents to the clinic with Follow-up (3 wk f/u/)     Kristi is here follow-up swelling in her right parotid gland   She took the Augmentin in the 11 bites and the gland has gone away and that is back to normal.  She still hears her pulse in her ears        Review of Systems   Review of Systems   Constitutional: Negative.    HENT: Negative.     Eyes: Negative.    Respiratory: Negative.     Cardiovascular: Negative.    Gastrointestinal: Negative.    Endocrine: Negative.    Genitourinary: Negative.    Musculoskeletal: Negative.    Integumentary:  Negative.   Neurological: Negative.    Psychiatric/Behavioral: Negative.          Medical / Social / Family History     Past Medical History:   Diagnosis Date    Arthritis     GERD (gastroesophageal reflux disease)     History of CVA (cerebrovascular accident)     Mixed hyperlipidemia     Osteoporosis     Vitamin D deficiency        Past Surgical History:   Procedure Laterality Date    BLADDER SUSPENSION      BREAST BIOPSY      CATARACT EXTRACTION Bilateral     CHOLECYSTECTOMY      HERNIA REPAIR      HYSTERECTOMY         Social History  Ms. Garvey  reports that she has never smoked. She has never used smokeless tobacco. She reports that she does not currently use alcohol. She reports that she does not use drugs.    Family History  Ms.'s Garvey  family history includes Hypertension in her father.    Medications and Allergies     Medications  Outpatient Medications Marked as Taking  for the 3/13/24 encounter (Office Visit) with Rafat Walker MD   Medication Sig Dispense Refill    albuterol (PROVENTIL) 2.5 mg /3 mL (0.083 %) nebulizer solution Take 3 mLs (2.5 mg total) by nebulization every 6 (six) hours as needed for Wheezing. Rescue 28 each 11    atorvastatin (LIPITOR) 20 MG tablet Take 30 mg by mouth Daily.      azelastine (ASTELIN) 137 mcg (0.1 %) nasal spray 1 spray by Nasal route Daily.      calcium carbonate (CALCIUM 600) 600 mg calcium (1,500 mg) Tab Take 600 mg by mouth once daily.      clopidogreL (PLAVIX) 75 mg tablet TAKE 1 TABLET DAILY 90 tablet 3    fluticasone propionate (FLONASE) 50 mcg/actuation nasal spray 1 spray by Each Nostril route Daily.      milk thistle 500 mg Cap Take 1 Capful by mouth once daily.      multivit-min-FA-lycopen-lutein (CENTRUM SILVER) 0.4 mg-300 mcg- 250 mcg Tab Take 1 tablet by mouth once daily.      pantoprazole (PROTONIX) 40 MG tablet Take 40 mg by mouth once daily.      ursodioL (ACTIGALL) 300 mg capsule Take 1 capsule by mouth once daily.      vitamin D (VITAMIN D3) 1000 units Tab tablet      [DISCONTINUED] famotidine (PEPCID) 40 MG tablet Take 40 mg by mouth once daily.      [DISCONTINUED] fluticasone-salmeterol diskus inhaler 100-50 mcg Inhale 1 puff into the lungs 2 (two) times daily. Controller         Allergies  Review of patient's allergies indicates:   Allergen Reactions    Aspirin Anaphylaxis    Codeine Nausea Only    Sulfa (sulfonamide antibiotics) Nausea Only       Physical Examination     Vitals:    03/13/24 0911   BP: 124/68   Pulse: 64     Physical Exam  Constitutional:       Appearance: Normal appearance.   HENT:      Head: Normocephalic and atraumatic.      Right Ear: Tympanic membrane normal.      Left Ear: Tympanic membrane normal.      Nose: Nose normal.      Mouth/Throat:      Mouth: Mucous membranes are moist.   Eyes:      Extraocular Movements: Extraocular movements intact.      Pupils: Pupils are equal, round, and  reactive to light.   Cardiovascular:      Rate and Rhythm: Normal rate and regular rhythm.      Pulses: Normal pulses.   Pulmonary:      Effort: Pulmonary effort is normal.      Breath sounds: Normal breath sounds.   Abdominal:      General: Abdomen is flat. Bowel sounds are normal.      Palpations: Abdomen is soft.   Musculoskeletal:         General: Normal range of motion.      Cervical back: Normal range of motion and neck supple.   Skin:     General: Skin is warm and dry.   Neurological:      General: No focal deficit present.      Mental Status: She is alert and oriented to person, place, and time.   Psychiatric:         Mood and Affect: Mood normal.         Behavior: Behavior normal.          Assessment and Plan (including Health Maintenance)      Problem List  Smart Quibly  Document Outside HM   :    Plan:    ICD-10-CM ICD-9-CM   1. Parotiditis  K11.20 527.2   2. History of CVA (cerebrovascular accident)  Z86.73 V12.54       Problem List Items Addressed This Visit          Neuro    History of CVA (cerebrovascular accident) (Chronic)     Carotid ultrasound normal   The soft sound she hears in her ears probably due to a tortuous carotid artery and no obstruction            ENT    Parotiditis - Primary     Parotid gland respond to therapy and back to normal   No further treatment needed.                   Health Maintenance Due   Topic Date Due    Pneumococcal Vaccines (Age 65+) (1 of 2 - PCV) Never done    Shingles Vaccine (2 of 3) 12/16/2014       Problem List Items Addressed This Visit          Neuro    History of CVA (cerebrovascular accident) (Chronic)    Current Assessment & Plan     Carotid ultrasound normal   The soft sound she hears in her ears probably due to a tortuous carotid artery and no obstruction            ENT    Parotiditis - Primary    Current Assessment & Plan     Parotid gland respond to therapy and back to normal   No further treatment needed.            Health Maintenance Topics with due  status: Not Due       Topic Last Completion Date    TETANUS VACCINE 09/17/2020    DEXA Scan 11/12/2020    Lipid Panel 02/09/2024    Aspirin/Antiplatelet Therapy 02/21/2024       No future appointments.         Signature:  Rafat Gomez MD  [facility]  [address] [place] [address]  [place] [address]    Date of encounter: 3/13/24

## 2024-04-09 DIAGNOSIS — J45.21 MILD INTERMITTENT ASTHMA WITH ACUTE EXACERBATION: Chronic | ICD-10-CM

## 2024-04-09 RX ORDER — FLUTICASONE PROPIONATE AND SALMETEROL 100; 50 UG/1; UG/1
1 POWDER RESPIRATORY (INHALATION) 2 TIMES DAILY
Qty: 60 EACH | Refills: 5 | Status: SHIPPED | OUTPATIENT
Start: 2024-04-09

## 2024-05-27 PROBLEM — Z00.00 ENCOUNTER FOR MEDICARE ANNUAL WELLNESS EXAM: Status: RESOLVED | Noted: 2024-02-20 | Resolved: 2024-05-27

## 2024-09-20 DIAGNOSIS — J45.21 MILD INTERMITTENT ASTHMA WITH ACUTE EXACERBATION: Chronic | ICD-10-CM

## 2024-09-20 RX ORDER — FLUTICASONE PROPIONATE AND SALMETEROL 100; 50 UG/1; UG/1
POWDER RESPIRATORY (INHALATION)
Qty: 60 EACH | Refills: 1 | Status: SHIPPED | OUTPATIENT
Start: 2024-09-20

## 2024-10-16 DIAGNOSIS — I51.9 HEART DISEASE: ICD-10-CM

## 2024-10-16 RX ORDER — CLOPIDOGREL BISULFATE 75 MG/1
TABLET ORAL
Qty: 90 TABLET | Refills: 3 | Status: SHIPPED | OUTPATIENT
Start: 2024-10-16

## 2024-11-20 ENCOUNTER — LAB VISIT (OUTPATIENT)
Dept: LAB | Facility: HOSPITAL | Age: 84
End: 2024-11-20
Attending: INTERNAL MEDICINE
Payer: MEDICARE

## 2024-11-20 DIAGNOSIS — K21.9 GASTROESOPHAGEAL REFLUX DISEASE, UNSPECIFIED WHETHER ESOPHAGITIS PRESENT: ICD-10-CM

## 2024-11-20 DIAGNOSIS — K44.9 HIATAL HERNIA: ICD-10-CM

## 2024-11-20 DIAGNOSIS — K75.4 CHRONIC AGGRESSIVE HEPATITIS: Primary | ICD-10-CM

## 2024-11-20 DIAGNOSIS — J45.909 ASTHMATIC BRONCHITIS: ICD-10-CM

## 2024-11-20 LAB
ALBUMIN SERPL-MCNC: 4.1 G/DL (ref 3.4–4.8)
ALBUMIN/GLOB SERPL: 1.6 RATIO (ref 1.1–2)
ALP SERPL-CCNC: 84 UNIT/L (ref 40–150)
ALT SERPL-CCNC: 20 UNIT/L (ref 0–55)
ANION GAP SERPL CALC-SCNC: 8 MEQ/L
AST SERPL-CCNC: 20 UNIT/L (ref 5–34)
BASOPHILS # BLD AUTO: 0.03 X10(3)/MCL
BASOPHILS NFR BLD AUTO: 0.5 %
BILIRUB DIRECT SERPL-MCNC: 0.4 MG/DL (ref 0–?)
BILIRUB SERPL-MCNC: 1.2 MG/DL
BUN SERPL-MCNC: 14.4 MG/DL (ref 9.8–20.1)
CALCIUM SERPL-MCNC: 10.1 MG/DL (ref 8.4–10.2)
CHLORIDE SERPL-SCNC: 108 MMOL/L (ref 98–107)
CO2 SERPL-SCNC: 28 MMOL/L (ref 23–31)
CREAT SERPL-MCNC: 0.72 MG/DL (ref 0.55–1.02)
CREAT/UREA NIT SERPL: 20
EOSINOPHIL # BLD AUTO: 0.22 X10(3)/MCL (ref 0–0.9)
EOSINOPHIL NFR BLD AUTO: 3.4 %
ERYTHROCYTE [DISTWIDTH] IN BLOOD BY AUTOMATED COUNT: 12.8 % (ref 11.5–17)
GFR SERPLBLD CREATININE-BSD FMLA CKD-EPI: >60 ML/MIN/1.73/M2
GLOBULIN SER-MCNC: 2.5 GM/DL (ref 2.4–3.5)
GLUCOSE SERPL-MCNC: 95 MG/DL (ref 82–115)
HCT VFR BLD AUTO: 47.4 % (ref 37–47)
HGB BLD-MCNC: 15 G/DL (ref 12–16)
IMM GRANULOCYTES # BLD AUTO: 0.01 X10(3)/MCL (ref 0–0.04)
IMM GRANULOCYTES NFR BLD AUTO: 0.2 %
LYMPHOCYTES # BLD AUTO: 2.32 X10(3)/MCL (ref 0.6–4.6)
LYMPHOCYTES NFR BLD AUTO: 35.8 %
MCH RBC QN AUTO: 30.4 PG (ref 27–31)
MCHC RBC AUTO-ENTMCNC: 31.6 G/DL (ref 33–36)
MCV RBC AUTO: 96.1 FL (ref 80–94)
MONOCYTES # BLD AUTO: 0.42 X10(3)/MCL (ref 0.1–1.3)
MONOCYTES NFR BLD AUTO: 6.5 %
NEUTROPHILS # BLD AUTO: 3.48 X10(3)/MCL (ref 2.1–9.2)
NEUTROPHILS NFR BLD AUTO: 53.6 %
NRBC BLD AUTO-RTO: 0 %
PLATELET # BLD AUTO: 198 X10(3)/MCL (ref 130–400)
PMV BLD AUTO: 9.6 FL (ref 7.4–10.4)
POTASSIUM SERPL-SCNC: 4.4 MMOL/L (ref 3.5–5.1)
PROT SERPL-MCNC: 6.6 GM/DL (ref 5.8–7.6)
RBC # BLD AUTO: 4.93 X10(6)/MCL (ref 4.2–5.4)
SODIUM SERPL-SCNC: 144 MMOL/L (ref 136–145)
WBC # BLD AUTO: 6.48 X10(3)/MCL (ref 4.5–11.5)

## 2024-11-20 PROCEDURE — 82248 BILIRUBIN DIRECT: CPT

## 2024-11-20 PROCEDURE — 85025 COMPLETE CBC W/AUTO DIFF WBC: CPT

## 2024-11-20 PROCEDURE — 80053 COMPREHEN METABOLIC PANEL: CPT

## 2024-11-20 PROCEDURE — 36415 COLL VENOUS BLD VENIPUNCTURE: CPT

## 2024-11-25 DIAGNOSIS — J45.21 MILD INTERMITTENT ASTHMA WITH ACUTE EXACERBATION: Chronic | ICD-10-CM

## 2024-11-25 RX ORDER — FLUTICASONE PROPIONATE AND SALMETEROL 100; 50 UG/1; UG/1
POWDER RESPIRATORY (INHALATION)
Qty: 60 EACH | Refills: 3 | Status: SHIPPED | OUTPATIENT
Start: 2024-11-25

## 2024-12-03 ENCOUNTER — TELEPHONE (OUTPATIENT)
Dept: INTERNAL MEDICINE | Facility: CLINIC | Age: 84
End: 2024-12-03
Payer: MEDICARE

## 2024-12-03 DIAGNOSIS — I51.9 HEART DISEASE: ICD-10-CM

## 2024-12-03 RX ORDER — CLOPIDOGREL BISULFATE 75 MG/1
75 TABLET ORAL DAILY
Qty: 90 TABLET | Refills: 3 | Status: SHIPPED | OUTPATIENT
Start: 2024-12-03

## 2024-12-03 NOTE — TELEPHONE ENCOUNTER
----- Message from Luz sent at 12/3/2024  1:25 PM CST -----  Who Called: Kristi Garvey    Refill or New Rx:Refill  RX Name and Strength:clopidogreL (PLAVIX) 75 mg tablet   How is the patient currently taking it? (ex. 1XDay):1x  Is this a 30 day or 90 day RX:90  Local or Mail Order:mail  List of preferred pharmacies on file (remove unneeded): [facility] - CARLYLE Odonnell - [facility] AT Portal to Registered [facility] Sites   Phone: [phone]  Fax: [phone]        If different Pharmacy is requested, enter Pharmacy information here including location and phone number:    Ordering Provider:      Preferred Method of Contact: Phone Call  Patient's Preferred Phone Number on File: [phone]   Best Call Back Number, if different:  Additional Information: Pt is requesting a refill on medication

## 2025-01-02 ENCOUNTER — OFFICE VISIT (OUTPATIENT)
Dept: INTERNAL MEDICINE | Facility: CLINIC | Age: 85
End: 2025-01-02
Payer: MEDICARE

## 2025-01-02 ENCOUNTER — TELEPHONE (OUTPATIENT)
Dept: INTERNAL MEDICINE | Facility: CLINIC | Age: 85
End: 2025-01-02

## 2025-01-02 VITALS
DIASTOLIC BLOOD PRESSURE: 72 MMHG | HEIGHT: 62 IN | OXYGEN SATURATION: 96 % | WEIGHT: 153.63 LBS | BODY MASS INDEX: 28.27 KG/M2 | SYSTOLIC BLOOD PRESSURE: 134 MMHG | HEART RATE: 85 BPM

## 2025-01-02 DIAGNOSIS — J45.20 MILD INTERMITTENT ASTHMA WITHOUT COMPLICATION: ICD-10-CM

## 2025-01-02 DIAGNOSIS — K75.4 AUTOIMMUNE HEPATITIS: ICD-10-CM

## 2025-01-02 DIAGNOSIS — J06.9 UPPER RESPIRATORY TRACT INFECTION, UNSPECIFIED TYPE: Primary | ICD-10-CM

## 2025-01-02 DIAGNOSIS — R05.1 ACUTE COUGH: ICD-10-CM

## 2025-01-02 PROCEDURE — 0241U COVID/RSV/FLU A&B PCR: CPT | Performed by: NURSE PRACTITIONER

## 2025-01-02 RX ORDER — CETIRIZINE HYDROCHLORIDE 10 MG/1
10 TABLET ORAL DAILY
COMMUNITY

## 2025-01-02 RX ORDER — FLUTICASONE PROPIONATE 50 MCG
2 SPRAY, SUSPENSION (ML) NASAL DAILY
Qty: 15.8 ML | Refills: 2 | Status: SHIPPED | OUTPATIENT
Start: 2025-01-02

## 2025-01-02 RX ORDER — BENZONATATE 200 MG/1
200 CAPSULE ORAL 3 TIMES DAILY PRN
Qty: 30 CAPSULE | Refills: 0 | Status: SHIPPED | OUTPATIENT
Start: 2025-01-02 | End: 2025-01-12

## 2025-01-02 NOTE — TELEPHONE ENCOUNTER
----- Message from KASEY Malone sent at 1/2/2025  4:12 PM CST -----  Please inform patient of results.    1. COVID/flu/RSV negative.  Continue symptom treatment as discussed during recent visit.  Notify clinic if symptoms worsen, persist, or if new symptoms develop.

## 2025-01-02 NOTE — PROGRESS NOTES
Internal Medicine    Patient Name:  Kristi Garvey   Patient ID: [id number]     Chief Complaint: Cough and Nasal Congestion      HPI:     Kristi Garvey is a 84 y.o. female, known to Dr Walker, is here today for requested visit.  Medical comorbidities include GERD, HLD, osteoporosis, vitamin-D deficiency, and h/o CVA.  Presents with complaints of sinus congestion and productive cough with green sputum which started yesterday.  Denies fever or chills.  Takes daily Zyrtec.  Has not tried OTC measures for sinus congestion or cough.    Last AWV: 02/21/2024       Past Medical History:   Diagnosis Date    Arthritis     GERD (gastroesophageal reflux disease)     History of CVA (cerebrovascular accident)     Mixed hyperlipidemia     Osteoporosis     Vitamin D deficiency         Past Surgical History:   Procedure Laterality Date    BLADDER SUSPENSION      BREAST BIOPSY      CATARACT EXTRACTION Bilateral     CHOLECYSTECTOMY      HERNIA REPAIR      HYSTERECTOMY          Social History     Tobacco Use    Smoking status: Never    Smokeless tobacco: Never   Substance and Sexual Activity    Alcohol use: Not Currently    Drug use: Never    Sexual activity: Not on file        Current Outpatient Medications   Medication Instructions    albuterol (PROVENTIL) 2.5 mg, Nebulization, Every 6 hours PRN, Rescue    atorvastatin (LIPITOR) 30 mg, Daily    azelastine (ASTELIN) 137 mcg (0.1 %) nasal spray 1 spray, Daily    benzonatate (TESSALON) 200 mg, Oral, 3 times daily PRN    calcium carbonate (CALCIUM 600) 600 mg, Daily    cetirizine (ZYRTEC) 10 mg, Oral, Daily    clopidogreL (PLAVIX) 75 mg, Oral, Daily    fluticasone propionate (FLONASE) 100 mcg, Each Nostril, Daily    fluticasone-salmeterol 100-50 mcg/dose (WIXELA INHUB) 100-50 mcg/dose diskus inhaler USE 1 INHALATION ORALLY    TWICE DAILY. CONTROLLER    milk thistle 500 mg Cap 1 Capful, Daily    multivit-min-FA-lycopen-lutein (CENTRUM SILVER) 0.4 mg-300 mcg- 250 mcg Tab 1 tablet,  "Daily    pantoprazole (PROTONIX) 40 mg, Daily    ursodioL (ACTIGALL) 300 mg capsule 1 capsule, Daily    vitamin D (VITAMIN D3) 1000 units Tab tablet       Review of patient's allergies indicates:   Allergen Reactions    Aspirin Anaphylaxis    Codeine Nausea Only    Sulfa (sulfonamide antibiotics) Nausea Only        Patient Care Team:  Rafat Walker MD as PCP - General (Internal Medicine)  Rafat Walker MD     Subjective:     Review of Systems   Constitutional:  Negative for appetite change, chills, diaphoresis and fever.   HENT:  Positive for congestion and sinus pressure. Negative for ear pain and sore throat.    Eyes:  Negative for pain and visual disturbance.   Respiratory:  Positive for cough (productive cough name). Negative for chest tightness, shortness of breath and wheezing.    Cardiovascular:  Negative for chest pain, palpitations and leg swelling.   Gastrointestinal:  Negative for abdominal pain, constipation, diarrhea, nausea and vomiting.   Endocrine: Negative for cold intolerance.   Genitourinary:  Negative for difficulty urinating, dysuria, frequency and hematuria.   Musculoskeletal:  Negative for arthralgias and myalgias.   Skin:  Negative for color change and rash.   Allergic/Immunologic: Negative.    Neurological: Negative.  Negative for dizziness, syncope, light-headedness and numbness.   Hematological: Negative.    Psychiatric/Behavioral: Negative.  Negative for dysphoric mood. The patient is not nervous/anxious.    All other systems reviewed and are negative.      Objective:     Visit Vitals  /72 (BP Location: Right arm, Patient Position: Sitting)   Pulse 85   Ht 5' 2" (1.575 m)   Wt 69.7 kg (153 lb 9.6 oz)   SpO2 96%   BMI 28.09 kg/m²       Physical Exam  Vitals and nursing note reviewed.   Constitutional:       General: She is not in acute distress.     Appearance: She is not ill-appearing.   HENT:      Head: Normocephalic and atraumatic.      Right Ear: Tympanic membrane " and ear canal normal.      Left Ear: Tympanic membrane and ear canal normal.      Nose: Congestion present. No rhinorrhea.      Mouth/Throat:      Mouth: Mucous membranes are moist.      Pharynx: Oropharynx is clear. No oropharyngeal exudate or posterior oropharyngeal erythema.   Eyes:      General: No scleral icterus.     Extraocular Movements: Extraocular movements intact.      Conjunctiva/sclera: Conjunctivae normal.      Pupils: Pupils are equal, round, and reactive to light.   Neck:      Vascular: No carotid bruit.   Cardiovascular:      Rate and Rhythm: Normal rate and regular rhythm.      Heart sounds: Normal heart sounds. No murmur heard.     No friction rub. No gallop.   Pulmonary:      Effort: Pulmonary effort is normal. No respiratory distress.      Breath sounds: Normal breath sounds. No wheezing, rhonchi or rales.   Abdominal:      General: Bowel sounds are normal. There is no distension.      Palpations: Abdomen is soft. There is no mass.      Tenderness: There is no abdominal tenderness.   Musculoskeletal:         General: Normal range of motion.      Cervical back: Normal range of motion and neck supple.   Lymphadenopathy:      Cervical: No cervical adenopathy.   Skin:     General: Skin is warm and dry.      Capillary Refill: Capillary refill takes less than 2 seconds.   Neurological:      General: No focal deficit present.      Mental Status: She is alert and oriented to person, place, and time.   Psychiatric:         Mood and Affect: Mood normal.         Behavior: Behavior normal.         Labs Reviewed:     Chemistry:  Lab Results   Component Value Date     11/20/2024    K 4.4 11/20/2024    BUN 14.4 11/20/2024    CREATININE 0.72 11/20/2024    EGFRNORACEVR >60 11/20/2024    GLUCOSE 95 11/20/2024    CALCIUM 10.1 11/20/2024    ALKPHOS 84 11/20/2024    LABPROT 6.6 11/20/2024    ALBUMIN 4.1 11/20/2024    BILIDIR 0.4 11/20/2024    IBILI 0.80 12/16/2021    AST 20 11/20/2024    ALT 20 11/20/2024     FLDBEMVT25QI 66.7 02/09/2024    TSH 0.854 05/23/2019        Lab Results   Component Value Date    HGBA1C 5.3 07/11/2019        Hematology:  Lab Results   Component Value Date    WBC 6.48 11/20/2024    HGB 15.0 11/20/2024    HCT 47.4 (H) 11/20/2024     11/20/2024       Lipid Panel:  Lab Results   Component Value Date    CHOL 204 (H) 02/09/2024    HDL 58 02/09/2024    .00 02/09/2024    TRIG 132 02/09/2024    TOTALCHOLEST 4 02/09/2024        Urine:  Lab Results   Component Value Date    APPEARANCEUA Clear 02/09/2024    SGUA 1.006 02/09/2024    PROTEINUA Negative 02/09/2024    KETONESUA Negative 02/09/2024    LEUKOCYTESUR 75 (A) 02/09/2024    RBCUA 0-5 02/09/2024    WBCUA 0-5 02/09/2024    BACTERIA Few (A) 02/09/2024    SQEPUA Trace 02/09/2024        Assessment:       ICD-10-CM ICD-9-CM   1. Upper respiratory tract infection, unspecified type  J06.9 465.9   2. Acute cough  R05.1 786.2   3. Mild intermittent asthma without complication  J45.20 493.90   4. Autoimmune hepatitis  K75.4 571.42        Plan:     1. Upper respiratory tract infection, unspecified type  Assessment & Plan:  obtain COVID/RSV/FLU PCR in office today  Continue daily Antihistamine and resume Flonase BID  Use OTC cold meds for symptoms (HTN and DM pt to avoid Sudafed or pseudoephedrine products).  Use OTC Tylenol or Advil for fever or body aches.  Get plenty of rest, eat a healthy diet, avoid alcohol and smoking.  Sore Throat: Use OTC lozenges or throat sprays, gargle with warm salt and water, warm tea with honey.  Nasal Congestion: Use OTC Mucinex D, humidifier or steamy shower.  Cough: Use Tessalon p.r.n. or Dextromethorphan/Doxylamine Cough Syrup at night.  Safety precautions/quarantine discussed   Cover your mouth with coughing, avoid sharing cups or lip balm, wash your hand before eating or touching your face.  Report fever greater than 101 F, breathing problems, painful or worsening cough, or changes in mucous (green, yellow,  bloody).  Notify clinic for any new or worsening symptoms.    Orders:  -     COVID/RSV/FLU A&B PCR; Future; Expected date: 01/02/2025  -     fluticasone propionate (FLONASE) 50 mcg/actuation nasal spray; 2 sprays (100 mcg total) by Each Nostril route Daily.  Dispense: 15.8 mL; Refill: 2    2. Acute cough  Assessment & Plan:  RX tessalon 200mg TID as needed sent to pharmacy.  Encouraged increase fluid hydration.     Orders:  -     benzonatate (TESSALON) 200 MG capsule; Take 1 capsule (200 mg total) by mouth 3 (three) times daily as needed for Cough.  Dispense: 30 capsule; Refill: 0    3. Mild intermittent asthma without complication  Assessment & Plan:  Continue Albuterol HFA QID PRN for wheezing + Wixela b.i.d..  Continue Zyrtec 10 mg daily and Flonase  Avoid/limit triggers such as pollen, dust, mold and animal dander.  Avoid smoke, strong chemicals, and strong cleaning products in addition to strong perfumes/colognes.  Use rescue inhaler when needed, use nebulizer for wheezing or URI.      4. Autoimmune hepatitis  Assessment & Plan:  Will check liver function with wellness labs  States she is followed by GI             Follow up for Previously scheduled and PRN if need. In addition to their scheduled follow up, the patient has also been instructed to follow up on as needed basis.       Future Appointments   Date Time Provider Department Center   2/26/2025  9:40 AM Rafat Walker MD [place] [id number]          KASEY Patricia

## 2025-01-02 NOTE — ASSESSMENT & PLAN NOTE
Continue Albuterol HFA QID PRN for wheezing + Wixela b.i.d..  Continue Zyrtec 10 mg daily and Flonase  Avoid/limit triggers such as pollen, dust, mold and animal dander.  Avoid smoke, strong chemicals, and strong cleaning products in addition to strong perfumes/colognes.  Use rescue inhaler when needed, use nebulizer for wheezing or URI.

## 2025-01-02 NOTE — ASSESSMENT & PLAN NOTE
obtain COVID/RSV/FLU PCR in office today  Continue daily Antihistamine and resume Flonase BID  Use OTC cold meds for symptoms (HTN and DM pt to avoid Sudafed or pseudoephedrine products).  Use OTC Tylenol or Advil for fever or body aches.  Get plenty of rest, eat a healthy diet, avoid alcohol and smoking.  Sore Throat: Use OTC lozenges or throat sprays, gargle with warm salt and water, warm tea with honey.  Nasal Congestion: Use OTC Mucinex D, humidifier or steamy shower.  Cough: Use Tessalon p.r.n. or Dextromethorphan/Doxylamine Cough Syrup at night.  Safety precautions/quarantine discussed   Cover your mouth with coughing, avoid sharing cups or lip balm, wash your hand before eating or touching your face.  Report fever greater than 101 F, breathing problems, painful or worsening cough, or changes in mucous (green, yellow, bloody).  Notify clinic for any new or worsening symptoms.

## 2025-02-12 ENCOUNTER — TELEPHONE (OUTPATIENT)
Dept: INTERNAL MEDICINE | Facility: CLINIC | Age: 85
End: 2025-02-12
Payer: MEDICARE

## 2025-02-12 DIAGNOSIS — E78.2 MIXED HYPERLIPIDEMIA: Chronic | ICD-10-CM

## 2025-02-12 DIAGNOSIS — E55.9 VITAMIN D DEFICIENCY: Primary | Chronic | ICD-10-CM

## 2025-02-12 DIAGNOSIS — Z79.899 ENCOUNTER FOR LONG-TERM (CURRENT) USE OF HIGH-RISK MEDICATION: ICD-10-CM

## 2025-02-12 NOTE — TELEPHONE ENCOUNTER
----- Message from [employer] sent at 2/12/2025  8:53 AM CST -----  Who Called: Kristi Garvey    Patient is returning phone call    Who Left Message for Patient:  Does the patient know what this is regarding?:      Preferred Method of Contact: Phone Call  Patient's Preferred Phone Number on File: [phone]   Best Call Back Number, if different:  Additional Information: pt called to speak with nurse going do labs on Friday 2/14/25 and need lab orders pls advise

## 2025-02-14 ENCOUNTER — LAB VISIT (OUTPATIENT)
Dept: LAB | Facility: HOSPITAL | Age: 85
End: 2025-02-14
Attending: INTERNAL MEDICINE
Payer: MEDICARE

## 2025-02-14 DIAGNOSIS — E78.2 MIXED HYPERLIPIDEMIA: ICD-10-CM

## 2025-02-14 DIAGNOSIS — Z79.899 ENCOUNTER FOR LONG-TERM (CURRENT) USE OF HIGH-RISK MEDICATION: ICD-10-CM

## 2025-02-14 DIAGNOSIS — E55.9 VITAMIN D DEFICIENCY: Chronic | ICD-10-CM

## 2025-02-14 LAB
25(OH)D3+25(OH)D2 SERPL-MCNC: 67 NG/ML (ref 30–80)
ALBUMIN SERPL-MCNC: 4 G/DL (ref 3.4–4.8)
ALBUMIN/GLOB SERPL: 1.5 RATIO (ref 1.1–2)
ALP SERPL-CCNC: 85 UNIT/L (ref 40–150)
ALT SERPL-CCNC: 22 UNIT/L (ref 0–55)
ANION GAP SERPL CALC-SCNC: 6 MEQ/L
AST SERPL-CCNC: 18 UNIT/L (ref 5–34)
BACTERIA #/AREA URNS AUTO: ABNORMAL /HPF
BASOPHILS # BLD AUTO: 0.04 X10(3)/MCL
BASOPHILS NFR BLD AUTO: 0.8 %
BILIRUB SERPL-MCNC: 1 MG/DL
BILIRUB UR QL STRIP.AUTO: NEGATIVE
BUN SERPL-MCNC: 13.5 MG/DL (ref 9.8–20.1)
CALCIUM SERPL-MCNC: 10.1 MG/DL (ref 8.4–10.2)
CHLORIDE SERPL-SCNC: 107 MMOL/L (ref 98–107)
CHOLEST SERPL-MCNC: 194 MG/DL
CHOLEST/HDLC SERPL: 3 {RATIO} (ref 0–5)
CLARITY UR: CLEAR
CO2 SERPL-SCNC: 29 MMOL/L (ref 23–31)
COLOR UR AUTO: COLORLESS
CREAT SERPL-MCNC: 0.73 MG/DL (ref 0.55–1.02)
CREAT/UREA NIT SERPL: 18
EOSINOPHIL # BLD AUTO: 0.2 X10(3)/MCL (ref 0–0.9)
EOSINOPHIL NFR BLD AUTO: 4 %
ERYTHROCYTE [DISTWIDTH] IN BLOOD BY AUTOMATED COUNT: 13.1 % (ref 11.5–17)
GFR SERPLBLD CREATININE-BSD FMLA CKD-EPI: >60 ML/MIN/1.73/M2
GLOBULIN SER-MCNC: 2.7 GM/DL (ref 2.4–3.5)
GLUCOSE SERPL-MCNC: 97 MG/DL (ref 82–115)
GLUCOSE UR QL STRIP: NORMAL
HCT VFR BLD AUTO: 47.3 % (ref 37–47)
HDLC SERPL-MCNC: 57 MG/DL (ref 35–60)
HGB BLD-MCNC: 15.3 G/DL (ref 12–16)
HGB UR QL STRIP: NEGATIVE
IMM GRANULOCYTES # BLD AUTO: 0.01 X10(3)/MCL (ref 0–0.04)
IMM GRANULOCYTES NFR BLD AUTO: 0.2 %
KETONES UR QL STRIP: NEGATIVE
LDLC SERPL CALC-MCNC: 114 MG/DL (ref 50–140)
LEUKOCYTE ESTERASE UR QL STRIP: NEGATIVE
LYMPHOCYTES # BLD AUTO: 2.06 X10(3)/MCL (ref 0.6–4.6)
LYMPHOCYTES NFR BLD AUTO: 41.4 %
MCH RBC QN AUTO: 30.4 PG (ref 27–31)
MCHC RBC AUTO-ENTMCNC: 32.3 G/DL (ref 33–36)
MCV RBC AUTO: 93.8 FL (ref 80–94)
MONOCYTES # BLD AUTO: 0.27 X10(3)/MCL (ref 0.1–1.3)
MONOCYTES NFR BLD AUTO: 5.4 %
NEUTROPHILS # BLD AUTO: 2.4 X10(3)/MCL (ref 2.1–9.2)
NEUTROPHILS NFR BLD AUTO: 48.2 %
NITRITE UR QL STRIP: NEGATIVE
NRBC BLD AUTO-RTO: 0 %
PH UR STRIP: 7 [PH]
PLATELET # BLD AUTO: 215 X10(3)/MCL (ref 130–400)
PMV BLD AUTO: 9.6 FL (ref 7.4–10.4)
POTASSIUM SERPL-SCNC: 3.9 MMOL/L (ref 3.5–5.1)
PROT SERPL-MCNC: 6.7 GM/DL (ref 5.8–7.6)
PROT UR QL STRIP: NEGATIVE
RBC # BLD AUTO: 5.04 X10(6)/MCL (ref 4.2–5.4)
RBC #/AREA URNS AUTO: ABNORMAL /HPF
SODIUM SERPL-SCNC: 142 MMOL/L (ref 136–145)
SP GR UR STRIP.AUTO: 1 (ref 1–1.03)
SQUAMOUS #/AREA URNS LPF: ABNORMAL /HPF
TRIGL SERPL-MCNC: 113 MG/DL (ref 37–140)
UROBILINOGEN UR STRIP-ACNC: NORMAL
VLDLC SERPL CALC-MCNC: 23 MG/DL
WBC # BLD AUTO: 4.98 X10(3)/MCL (ref 4.5–11.5)
WBC #/AREA URNS AUTO: ABNORMAL /HPF

## 2025-02-14 PROCEDURE — 36415 COLL VENOUS BLD VENIPUNCTURE: CPT

## 2025-02-14 PROCEDURE — 80061 LIPID PANEL: CPT

## 2025-02-14 PROCEDURE — 80053 COMPREHEN METABOLIC PANEL: CPT

## 2025-02-14 PROCEDURE — 85025 COMPLETE CBC W/AUTO DIFF WBC: CPT

## 2025-02-14 PROCEDURE — 82306 VITAMIN D 25 HYDROXY: CPT

## 2025-02-14 PROCEDURE — 81001 URINALYSIS AUTO W/SCOPE: CPT

## 2025-02-17 DIAGNOSIS — J45.21 MILD INTERMITTENT ASTHMA WITH ACUTE EXACERBATION: Chronic | ICD-10-CM

## 2025-02-17 RX ORDER — FLUTICASONE PROPIONATE AND SALMETEROL 100; 50 UG/1; UG/1
1 POWDER RESPIRATORY (INHALATION) 2 TIMES DAILY
Qty: 60 EACH | Refills: 3 | Status: SHIPPED | OUTPATIENT
Start: 2025-02-17

## 2025-02-21 ENCOUNTER — TELEPHONE (OUTPATIENT)
Dept: INTERNAL MEDICINE | Facility: CLINIC | Age: 85
End: 2025-02-21
Payer: MEDICARE

## 2025-02-21 NOTE — TELEPHONE ENCOUNTER
----- Message from Med Assistant Ramirez sent at 2/21/2025 10:42 AM CST -----  Regarding: Appointment 2/26/25  Patient has an appointment on 2/26Fasting labs DONEPlease call and remind patient of appointment

## 2025-02-26 ENCOUNTER — TELEPHONE (OUTPATIENT)
Dept: INTERNAL MEDICINE | Facility: CLINIC | Age: 85
End: 2025-02-26

## 2025-02-26 ENCOUNTER — OFFICE VISIT (OUTPATIENT)
Dept: INTERNAL MEDICINE | Facility: CLINIC | Age: 85
End: 2025-02-26
Payer: MEDICARE

## 2025-02-26 VITALS
BODY MASS INDEX: 27.42 KG/M2 | SYSTOLIC BLOOD PRESSURE: 118 MMHG | RESPIRATION RATE: 16 BRPM | HEART RATE: 76 BPM | WEIGHT: 149 LBS | HEIGHT: 62 IN | OXYGEN SATURATION: 98 % | DIASTOLIC BLOOD PRESSURE: 70 MMHG

## 2025-02-26 DIAGNOSIS — Z12.31 SCREENING MAMMOGRAM FOR BREAST CANCER: Primary | ICD-10-CM

## 2025-02-26 DIAGNOSIS — M81.8 OTHER OSTEOPOROSIS WITHOUT CURRENT PATHOLOGICAL FRACTURE: Chronic | ICD-10-CM

## 2025-02-26 DIAGNOSIS — E55.9 VITAMIN D DEFICIENCY: ICD-10-CM

## 2025-02-26 DIAGNOSIS — E78.2 MIXED HYPERLIPIDEMIA: Chronic | ICD-10-CM

## 2025-02-26 DIAGNOSIS — Z13.220 LIPID SCREENING: ICD-10-CM

## 2025-02-26 DIAGNOSIS — J45.20 MILD INTERMITTENT ASTHMA WITHOUT COMPLICATION: ICD-10-CM

## 2025-02-26 DIAGNOSIS — Z00.00 ENCOUNTER FOR MEDICARE ANNUAL WELLNESS EXAM: Primary | ICD-10-CM

## 2025-02-26 RX ORDER — AZELASTINE 1 MG/ML
1 SPRAY, METERED NASAL DAILY
Qty: 30 ML | Refills: 11 | Status: SHIPPED | OUTPATIENT
Start: 2025-02-26

## 2025-02-26 NOTE — TELEPHONE ENCOUNTER
----- Message from Mercedes sent at 2/26/2025  9:59 AM CST -----  Regarding: mammo  Patient would like mammogram ordered at [facility]

## 2025-02-26 NOTE — ASSESSMENT & PLAN NOTE
Discussed results of laboratory examination   Continue medications   Refill Astelin   Revisit 1 year

## 2025-02-26 NOTE — PROGRESS NOTES
Rafat Walker MD        PATIENT NAME: Kristi Garvey  : 1940  DATE: 25  MRN: [id number]      Patient Care Team:  Rafat Walker MD as PCP - General (Internal Medicine)  Rafat Walker MD       Billing Provider: Rafat Walker MD  Level of Service: PR MEDICARE ANNUAL WELLNESS SUBSEQUENT VISIT  Patient PCP Information       Provider PCP Type    Rafat Walker MD General            Reason for Visit / Chief Complaint: Medicare AWV Follow Up       Update PCP  Update Chief Complaint         History of Present Illness / Problem Focused Workflow     Kristi Garvey presents to the clinic with Medicare AWV Follow Up     Kristi is here for annual wellness exam   She is doing well occasionally she has asthma issue she uses inhaler.        Review of Systems   Review of Systems   Constitutional: Negative.    HENT: Negative.     Eyes: Negative.    Respiratory: Negative.     Cardiovascular: Negative.    Gastrointestinal: Negative.    Endocrine: Negative.    Genitourinary: Negative.    Musculoskeletal: Negative.    Integumentary:  Negative.   Neurological: Negative.    Psychiatric/Behavioral: Negative.          Patient Reported Health Risk Assessment       Medical / Social / Family History     Past Medical History:   Diagnosis Date    Arthritis     GERD (gastroesophageal reflux disease)     History of CVA (cerebrovascular accident)     Mixed hyperlipidemia     Osteoporosis     Vitamin D deficiency        Past Surgical History:   Procedure Laterality Date    BLADDER SUSPENSION      BREAST BIOPSY      CATARACT EXTRACTION Bilateral     CHOLECYSTECTOMY      HERNIA REPAIR      HYSTERECTOMY         Social History  Ms. Garvey  reports that she has never smoked. She has never used smokeless tobacco. She reports that she does not currently use alcohol. She reports that she does not use drugs.    Family History  Ms.'s Garvey  family history includes Hypertension in her  father.        Medications and Allergies     Medications  Outpatient Medications Marked as Taking for the 2/26/25 encounter (Office Visit) with Rafat Walker MD   Medication Sig Dispense Refill    albuterol (PROVENTIL) 2.5 mg /3 mL (0.083 %) nebulizer solution Take 3 mLs (2.5 mg total) by nebulization every 6 (six) hours as needed for Wheezing. Rescue 28 each 11    atorvastatin (LIPITOR) 20 MG tablet Take 30 mg by mouth Daily.      azelastine (ASTELIN) 137 mcg (0.1 %) nasal spray 1 spray by Nasal route Daily.      calcium carbonate (CALCIUM 600) 600 mg calcium (1,500 mg) Tab Take 600 mg by mouth once daily.      cetirizine (ZYRTEC) 10 MG tablet Take 10 mg by mouth once daily.      clopidogreL (PLAVIX) 75 mg tablet Take 1 tablet (75 mg total) by mouth once daily. 90 tablet 3    fluticasone propionate (FLONASE) 50 mcg/actuation nasal spray 2 sprays (100 mcg total) by Each Nostril route Daily. 15.8 mL 2    fluticasone-salmeterol diskus inhaler 100-50 mcg Inhale 1 puff into the lungs 2 (two) times a day. Controller 60 each 3    milk thistle 500 mg Cap Take 1 Capful by mouth once daily.      multivit-min-FA-lycopen-lutein (CENTRUM SILVER) 0.4 mg-300 mcg- 250 mcg Tab Take 1 tablet by mouth once daily.      pantoprazole (PROTONIX) 40 MG tablet Take 40 mg by mouth once daily.      ursodioL (ACTIGALL) 300 mg capsule Take 1 capsule by mouth once daily.      vitamin D (VITAMIN D3) 1000 units Tab tablet         Allergies  Review of patient's allergies indicates:   Allergen Reactions    Aspirin Anaphylaxis    Codeine Nausea Only    Sulfa (sulfonamide antibiotics) Nausea Only       Physical Examination   There were no vitals filed for this visit.  Physical Exam  Constitutional:       Appearance: Normal appearance.   HENT:      Head: Normocephalic and atraumatic.      Right Ear: Tympanic membrane normal.      Left Ear: Tympanic membrane normal.      Nose: Nose normal.      Mouth/Throat:      Mouth: Mucous membranes are  moist.   Eyes:      Extraocular Movements: Extraocular movements intact.      Pupils: Pupils are equal, round, and reactive to light.   Cardiovascular:      Rate and Rhythm: Normal rate and regular rhythm.      Pulses: Normal pulses.   Pulmonary:      Effort: Pulmonary effort is normal.      Breath sounds: Normal breath sounds.   Abdominal:      General: Abdomen is flat. Bowel sounds are normal.      Palpations: Abdomen is soft.   Musculoskeletal:         General: Normal range of motion.      Cervical back: Normal range of motion and neck supple.   Skin:     General: Skin is warm and dry.   Neurological:      General: No focal deficit present.      Mental Status: She is alert and oriented to person, place, and time.   Psychiatric:         Mood and Affect: Mood normal.         Behavior: Behavior normal.               No data to display                  2/26/2025     9:40 AM 1/2/2025     1:20 PM 3/13/2024     9:20 AM 2/21/2024     9:20 AM 10/19/2023     9:00 AM 1/25/2023    11:00 AM   Fall Risk Assessment - Outpatient   Mobility Status Ambulatory Ambulatory Ambulatory Ambulatory Ambulatory Ambulatory   Number of falls 1 0 0 1 with injury 0 0   Identified as fall risk False False False True False False                Assessment and Plan (including Health Maintenance)      Problem List  Smart Sets  Document Outside HM   :    Plan:       ICD-10-CM ICD-9-CM   1. Encounter for Medicare annual wellness exam  Z00.00 V70.0   2. Mixed hyperlipidemia  E78.2 272.2   3. Other osteoporosis without current pathological fracture  M81.8 733.09   4. Mild intermittent asthma without complication  J45.20 493.90   5. Vitamin D deficiency  E55.9 268.9   6. Lipid screening  Z13.220 V77.91               Health Maintenance Due   Topic Date Due    Pneumococcal Vaccines (Age 50+) (1 of 2 - PCV) Never done    Shingles Vaccine (2 of 3) 12/16/2014       Problem List Items Addressed This Visit          Pulmonary    Mild intermittent asthma without  complication (Chronic)    Current Assessment & Plan   Stable continue inhaler treatment            Cardiac/Vascular    Mixed hyperlipidemia (Chronic)    Current Assessment & Plan   Lipid levels excellent continue treatment            Endocrine    Osteoporosis (Chronic)    Current Assessment & Plan   Stable on calcium and vitamin-D         Vitamin D deficiency (Chronic)    Current Assessment & Plan   Continue vitamin-D therapy            Other    RESOLVED: Encounter for Medicare annual wellness exam - Primary    Current Assessment & Plan   Discussed results of laboratory examination   Continue medications   Refill Astelin   Revisit 1 year          Other Visit Diagnoses         Lipid screening                Health Maintenance Topics with due status: Not Due       Topic Last Completion Date    TETANUS VACCINE 09/17/2020    Aspirin/Antiplatelet Therapy 01/02/2025    Lipid Panel 02/14/2025       No future appointments.       Medicare Annual Wellness and Personalized Prevention Plan:   Fall Risk + Home Safety + Hearing Impairment + Depression Screen + Cognitive Impairment Screen + Health Risk Assessment all reviewed.         Advance Care Planning     Date: 02/26/2025  Patient did not wish or was not able to name a surrogate decision maker or provide an Advance Care Plan.           Opioid Screening: Patient medication list reviewed, patient is not taking prescription opioids. Patient is not using additional opioids than prescribed. Patient is at low risk of substance abuse based on this opioid use history.        Signature:  Rafat Gomez MD  [facility]  [address] [id number]    Date of encounter: 2/26/25    Follow up in about 1 year (around 2/26/2026) for Medicare Wellness with labs. In addition to their scheduled follow up, the patient has also been instructed to follow up on as needed basis.

## 2025-03-12 ENCOUNTER — HOSPITAL ENCOUNTER (OUTPATIENT)
Dept: RADIOLOGY | Facility: HOSPITAL | Age: 85
Discharge: HOME | End: 2025-03-12
Attending: INTERNAL MEDICINE
Payer: MEDICARE

## 2025-03-12 DIAGNOSIS — Z12.31 SCREENING MAMMOGRAM FOR BREAST CANCER: ICD-10-CM

## 2025-03-12 PROCEDURE — 77063 BREAST TOMOSYNTHESIS BI: CPT | Mod: TC

## 2025-03-18 ENCOUNTER — HOSPITAL ENCOUNTER (OUTPATIENT)
Dept: RADIOLOGY | Facility: HOSPITAL | Age: 85
Discharge: HOME | End: 2025-03-18
Attending: INTERNAL MEDICINE
Payer: MEDICARE

## 2025-03-18 DIAGNOSIS — J45.21 MILD INTERMITTENT ASTHMA WITH ACUTE EXACERBATION: Chronic | ICD-10-CM

## 2025-03-18 DIAGNOSIS — R92.8 ABNORMAL SCREENING MAMMOGRAM: ICD-10-CM

## 2025-03-18 PROCEDURE — 77065 DX MAMMO INCL CAD UNI: CPT | Mod: 26,RT,, | Performed by: RADIOLOGY

## 2025-03-18 PROCEDURE — 77065 DX MAMMO INCL CAD UNI: CPT | Mod: TC,RT

## 2025-03-18 PROCEDURE — 77061 BREAST TOMOSYNTHESIS UNI: CPT | Mod: 26,RT,, | Performed by: RADIOLOGY

## 2025-03-18 RX ORDER — FLUTICASONE PROPIONATE AND SALMETEROL 100; 50 UG/1; UG/1
POWDER RESPIRATORY (INHALATION)
Qty: 180 EACH | Refills: 3 | Status: SHIPPED | OUTPATIENT
Start: 2025-03-18

## 2025-03-24 ENCOUNTER — TELEPHONE (OUTPATIENT)
Dept: INTERNAL MEDICINE | Facility: CLINIC | Age: 85
End: 2025-03-24
Payer: MEDICARE

## 2025-03-24 DIAGNOSIS — J45.21 MILD INTERMITTENT ASTHMA WITH ACUTE EXACERBATION: Chronic | ICD-10-CM

## 2025-03-24 RX ORDER — FLUTICASONE PROPIONATE AND SALMETEROL 100; 50 UG/1; UG/1
1 POWDER RESPIRATORY (INHALATION) 2 TIMES DAILY
Qty: 180 EACH | Refills: 3 | Status: SHIPPED | OUTPATIENT
Start: 2025-03-24

## 2025-03-24 NOTE — TELEPHONE ENCOUNTER
----- Message from Danya sent at 3/21/2025  3:27 PM CDT -----  Who Called: Kristi B MartinRefill or New Rx:RefillRX Name and Strength: fluticasone-salmeterol 100-50 mcg/dose (WIXELA INHUB) 100-50 mcg/dose diskus inhalerHow is the patient currently taking it? (ex. 1XDay):Is this a 30 day or 90 day RX:Local or Mail Order:List of preferred pharmacies on file (remove unneeded): [facility]Preferred Method of Contact: Phone CallPatient's Preferred Phone Number on File: [phone] Best Call Back Number, if different:Additional Information: pt is requesting refill  ----- Message -----  From: Danya Yu  Sent: 3/21/2025   3:29 PM CDT  To: Aaron CRUZ Staff    Who Called: Kristi B MartinRefill or New Rx:RefillRX Name and Strength: fluticasone-salmeterol 100-50 mcg/dose (WIXELA INHUB) 100-50 mcg/dose diskus inhalerHow is the patient currently taking it? (ex. 1XDay):Is this a 30 day or 90 day RX:Local or Mail Order:List of preferred pharmacies on file (remove unneeded): [facility] [place] [facility]Preferred Method of Contact: Phone CallPatient's Preferred Phone Number on File: [phone] Best Call Back Number, if different:Additional Information:  ----- Message -----  From: Danya Yu  Sent: 3/21/2025   3:28 PM CDT  To: Aaron Agosto

## 2025-03-27 ENCOUNTER — TELEPHONE (OUTPATIENT)
Dept: INTERNAL MEDICINE | Facility: CLINIC | Age: 85
End: 2025-03-27
Payer: MEDICARE

## 2025-03-27 DIAGNOSIS — J45.20 MILD INTERMITTENT ASTHMA WITHOUT COMPLICATION: Primary | Chronic | ICD-10-CM

## 2025-03-27 RX ORDER — ALBUTEROL SULFATE 0.83 MG/ML
2.5 SOLUTION RESPIRATORY (INHALATION) EVERY 6 HOURS PRN
Qty: 28 EACH | Refills: 11 | Status: SHIPPED | OUTPATIENT
Start: 2025-03-27 | End: 2026-03-27

## 2025-03-27 NOTE — TELEPHONE ENCOUNTER
----- Message from Hussein sent at 3/27/2025  9:22 AM CDT -----  Who Called: Kristi GarveyRefill or New Rx:RefillRX Name and Strength: albuterol (PROVENTIL) 2.5 mg /3 mL (0.083 %) nebulizer solutionHow is the patient currently taking it? (ex. 1XDay): Is this a 30 day or 90 day RX: Local or Mail Order: List of preferred pharmacies on file (remove unneeded):  [id number] - [place], [place] - [facility] [address]If different Pharmacy is requested, enter Pharmacy information here including location and phone number:   Ordering Provider: Preferred Method of Contact: Phone CallPatient's Preferred Phone Number on File: [phone] Best Call Back Number, if different:Additional Information:  refill

## 2025-04-07 ENCOUNTER — APPOINTMENT (OUTPATIENT)
Dept: LAB | Facility: HOSPITAL | Age: 85
End: 2025-04-07
Payer: MEDICARE

## 2025-04-07 ENCOUNTER — TELEPHONE (OUTPATIENT)
Dept: INTERNAL MEDICINE | Facility: CLINIC | Age: 85
End: 2025-04-07
Payer: MEDICARE

## 2025-04-07 DIAGNOSIS — R30.0 DYSURIA: Primary | ICD-10-CM

## 2025-04-07 DIAGNOSIS — R30.0 DYSURIA: ICD-10-CM

## 2025-04-07 LAB
BACTERIA #/AREA URNS AUTO: ABNORMAL /HPF
BILIRUB UR QL STRIP.AUTO: NEGATIVE
CLARITY UR: ABNORMAL
COLOR UR AUTO: ABNORMAL
EPI CELLS #/AREA URNS HPF: ABNORMAL /HPF
GLUCOSE UR QL STRIP: NORMAL
HGB UR QL STRIP: ABNORMAL
KETONES UR QL STRIP: NEGATIVE
LEUKOCYTE ESTERASE UR QL STRIP: 500
NITRITE UR QL STRIP: NEGATIVE
PH UR STRIP: 6.5 [PH]
PROT UR QL STRIP: NEGATIVE
RBC #/AREA URNS AUTO: ABNORMAL /HPF
SP GR UR STRIP.AUTO: 1.01 (ref 1–1.03)
SQUAMOUS #/AREA URNS LPF: ABNORMAL /HPF
UROBILINOGEN UR STRIP-ACNC: NORMAL
WBC #/AREA URNS AUTO: >100 /HPF
WBC CLUMPS UR QL AUTO: ABNORMAL

## 2025-04-07 PROCEDURE — 81015 MICROSCOPIC EXAM OF URINE: CPT

## 2025-04-07 PROCEDURE — 87086 URINE CULTURE/COLONY COUNT: CPT

## 2025-04-07 NOTE — TELEPHONE ENCOUNTER
----- Message from Gael sent at 4/7/2025  8:39 AM CDT -----  .Who Called: Kristi Rader is requesting assistance/information from provider's office.Symptoms (please be specific): possible UTI How long has patient had these symptoms:  2-3 daysList of preferred pharmacies on file (remove unneeded): [unfilled]If different, enter pharmacy into here including location and phone number: naPreferred Method of Contact: Phone CallPatient's Preferred Phone Number on File: [phone] Best Call Back Number, if different:Additional Information: pt wants nurse to give her call, if she needs to be seen

## 2025-04-07 NOTE — TELEPHONE ENCOUNTER
Spoke with pt she states that she possibly have an UTI, symptoms are frequent urination, has to go urgently when it happens, kind a funny feeling, cloudy, and foamy, she states that she peed in a cut and she seen crystals, please advise

## 2025-04-08 ENCOUNTER — TELEPHONE (OUTPATIENT)
Dept: INTERNAL MEDICINE | Facility: CLINIC | Age: 85
End: 2025-04-08
Payer: MEDICARE

## 2025-04-08 ENCOUNTER — RESULTS FOLLOW-UP (OUTPATIENT)
Dept: INTERNAL MEDICINE | Facility: CLINIC | Age: 85
End: 2025-04-08
Payer: MEDICARE

## 2025-04-08 DIAGNOSIS — N39.0 URINARY TRACT INFECTION WITHOUT HEMATURIA, SITE UNSPECIFIED: Primary | ICD-10-CM

## 2025-04-08 RX ORDER — NITROFURANTOIN 25; 75 MG/1; MG/1
100 CAPSULE ORAL 2 TIMES DAILY
Qty: 14 CAPSULE | Refills: 0 | Status: SHIPPED | OUTPATIENT
Start: 2025-04-08

## 2025-04-08 NOTE — PROGRESS NOTES
Please inform patient most recent urinalysis noted UTI.  If symptomatic okay to initiate patient on Macrobid 100 mg b.i.d. x7 days.  We will follow urine cultures, if adjustments in antibiotics needed we will do so once final urine cultures results.  Encouraged to increase fluid hydration.  Incorporate OTC probiotic or eat yogurt while on antibiotics.  Okay to utilize OTC azo tablets if she wishes.  Notify clinic for new or worsening symptoms.

## 2025-04-08 NOTE — TELEPHONE ENCOUNTER
Copied from CRM #[id number]. Topic: General Inquiry - Test Results  >> Apr 8, 2025 12:12 PM Felipa wrote:  .Type:  Test Results    Who Called: pt  Name of Test (Lab/Mammo/Etc): Urinalysis   Date of Test: 04/07/2025  Ordering Provider: Patricia  Where the test was performed: [facility]  Would the patient rather a call back or a response via [facility]?   Best Call Back Number: [phone]  Additional Information:  Please call back with the results

## 2025-04-09 ENCOUNTER — HOSPITAL ENCOUNTER (OUTPATIENT)
Dept: RADIOLOGY | Facility: HOSPITAL | Age: 85
Discharge: HOME | End: 2025-04-09
Attending: INTERNAL MEDICINE
Payer: MEDICARE

## 2025-04-09 DIAGNOSIS — R92.8 ABNORMAL FINDINGS ON DIAGNOSTIC IMAGING OF BREAST: ICD-10-CM

## 2025-04-09 LAB — BACTERIA UR CULT: ABNORMAL

## 2025-04-09 PROCEDURE — 88305 TISSUE EXAM BY PATHOLOGIST: CPT | Performed by: INTERNAL MEDICINE

## 2025-04-09 PROCEDURE — 19081 BX BREAST 1ST LESION STRTCTC: CPT | Mod: RT

## 2025-04-10 ENCOUNTER — RESULTS FOLLOW-UP (OUTPATIENT)
Dept: RADIOLOGY | Facility: HOSPITAL | Age: 85
End: 2025-04-10

## 2025-04-10 DIAGNOSIS — N60.91 ATYPICAL DUCTAL HYPERPLASIA OF RIGHT BREAST: Primary | ICD-10-CM

## 2025-04-10 LAB — PSYCHE PATHOLOGY RESULT: NORMAL

## 2025-04-24 ENCOUNTER — OFFICE VISIT (OUTPATIENT)
Dept: SURGERY | Facility: CLINIC | Age: 85
End: 2025-04-24
Payer: MEDICARE

## 2025-04-24 ENCOUNTER — CLINICAL SUPPORT (OUTPATIENT)
Dept: PREADMISSION TESTING | Facility: HOSPITAL | Age: 85
End: 2025-04-24
Attending: STUDENT IN AN ORGANIZED HEALTH CARE EDUCATION/TRAINING PROGRAM
Payer: MEDICARE

## 2025-04-24 VITALS
HEIGHT: 63 IN | WEIGHT: 149 LBS | TEMPERATURE: 46 F | BODY MASS INDEX: 26.4 KG/M2 | RESPIRATION RATE: 18 BRPM | HEART RATE: 78 BPM | OXYGEN SATURATION: 96 % | DIASTOLIC BLOOD PRESSURE: 81 MMHG | SYSTOLIC BLOOD PRESSURE: 123 MMHG

## 2025-04-24 DIAGNOSIS — Z01.818 PRE-OP TESTING: ICD-10-CM

## 2025-04-24 DIAGNOSIS — N60.91 ATYPICAL DUCTAL HYPERPLASIA OF RIGHT BREAST: ICD-10-CM

## 2025-04-24 DIAGNOSIS — Z01.818 PRE-OP TESTING: Primary | ICD-10-CM

## 2025-04-24 LAB
OHS QRS DURATION: 88 MS
OHS QTC CALCULATION: 405 MS

## 2025-04-24 PROCEDURE — 93005 ELECTROCARDIOGRAM TRACING: CPT

## 2025-04-24 PROCEDURE — 99215 OFFICE O/P EST HI 40 MIN: CPT | Mod: PBBFAC,25 | Performed by: STUDENT IN AN ORGANIZED HEALTH CARE EDUCATION/TRAINING PROGRAM

## 2025-04-24 PROCEDURE — 99204 OFFICE O/P NEW MOD 45 MIN: CPT | Mod: S$PBB,,, | Performed by: STUDENT IN AN ORGANIZED HEALTH CARE EDUCATION/TRAINING PROGRAM

## 2025-04-24 PROCEDURE — 93010 ELECTROCARDIOGRAM REPORT: CPT | Mod: ,,, | Performed by: INTERNAL MEDICINE

## 2025-04-24 PROCEDURE — 99999 PR PBB SHADOW E&M-EST. PATIENT-LVL V: CPT | Mod: PBBFAC,,, | Performed by: STUDENT IN AN ORGANIZED HEALTH CARE EDUCATION/TRAINING PROGRAM

## 2025-04-24 RX ORDER — CEFAZOLIN SODIUM 2 G/50ML
2 SOLUTION INTRAVENOUS
OUTPATIENT
Start: 2025-04-24

## 2025-04-24 RX ORDER — SODIUM CHLORIDE, SODIUM LACTATE, POTASSIUM CHLORIDE, CALCIUM CHLORIDE 600; 310; 30; 20 MG/100ML; MG/100ML; MG/100ML; MG/100ML
INJECTION, SOLUTION INTRAVENOUS CONTINUOUS
OUTPATIENT
Start: 2025-04-24

## 2025-04-24 NOTE — PROGRESS NOTES
[facility] Breast Surg  Breast Surgical Oncology  New Patient Office Visit - H&P      Care Team: Patient Care Team:  Rafat Walker MD as PCP - General (Internal Medicine)  Rafat Walker MD Hebert, Sandra, RN as Registered Nurse (Diagnostic Radiology)   Referring Provider: Dr. Rafat Walker    Chief Complaint:   Chief Complaint   Patient presents with    Breast Mass     Patient reports mass in right breast, denies any pain, swelling, redness          Subjective:      History of Present Illness:  Kristi Garvey is a pleasant 85 y.o.female who presents as a referral from Dr. Rafat Walker for right breast ADH.  On mammogram she has a 1 cm area of calcifications in the 6:00 right breast posterior depth.  She underwent biopsy and pathology resulted with ADH.  She has had a prior left breast excisional biopsy in the past in 1986 for hyperplasia, benign.  She denies any changes with her breasts that are concerning.    She does not have any history of breast cancer in her family.  She is present at the appointment today with her daughter in law.  She has had a TIA in the past and is on Plavix.  She lives by herself and attends to all her daily living activities.    Imaging:   Bilateral screening mammogram 03/12/2025: Density B. BI-RADS 0  in the right breast 6:00 axis, posterior depth there indeterminate calcifications.  There are multiple morphologically similar round circumscribed masses throughout both breasts with fluctuation in size over the years.  Statistically benign.  Right diagnostic mammogram 03/18/2025: Density B. BI-RADS 4.  Calcifications in a group linear distribution spanning 10 mm in the 6:00 right breast, posterior depth.  Stereotactic biopsy right breast 6:00 calcifications 04/09/2025: Coil clip placed.  Good position.    I have reviewed the imaging and agree with the radiologists interpretation. I have discussed these results with the  patient.    Pathology:   Calcifications at right breast 6:00 core biopsy 04/09/2025: Atypical ductal hyperplasia with calcifications    OB / GYN History   Menarche Onset:12  Menopause: Menopause: postmenopausal  Hormonal birth control (duration): 5years  Pregnancies: 3  Age at first child birth: 23  Child births: 3  Hysterectomy/Oophorectomy: hysterectomy with BSO, at age 55      Family History of Cancer (& age at diagnosis):  -   Family History   Problem Relation Name Age of Onset    Hypertension Father      Lymphoma Brother Mamadou 70 - 79    Multiple myeloma Brother Damian 65 - 69    Prostate cancer Brother Willard 50 - 59    Cancer Maternal Aunt  70 - 79        Lifestyle:  Height and Weight:   BMI: Body mass index is 26.39 kg/m².     Other History:     Past Medical History:   Diagnosis Date    Arthritis     GERD (gastroesophageal reflux disease)     History of CVA (cerebrovascular accident)     Mixed hyperlipidemia     Osteoporosis     Vitamin D deficiency         Past Surgical History:   Procedure Laterality Date    BLADDER SUSPENSION      BREAST BIOPSY      CATARACT EXTRACTION Bilateral     CHOLECYSTECTOMY      HERNIA REPAIR      HYSTERECTOMY          Social History[1]     Immunization History   Administered Date(s) Administered    COVID-19, MRNA, LN-S, PF (Pfizer) (Gray Cap) 05/19/2022    COVID-19, MRNA, LN-S, PF (Pfizer) (Purple Cap) 01/14/2021, 02/04/2021, 10/13/2021    COVID-19, mRNA, LNP-S, PF, chriss-sucrose, 30 mcg/0.3 mL (Pfizer Ages 12+) 10/23/2024    COVID-19, mRNA, LNP-S, bivalent booster, PF (PFIZER OMICRON) 10/13/2022    Hepatitis A / Hepatitis B 09/24/2019, 11/25/2019, 06/24/2020    Influenza 09/03/2020    Influenza (FLUAD) - Quadrivalent - Adjuvanted - PF *Preferred* (65+) 09/19/2022    Influenza - Quadrivalent 11/10/2016    Influenza - Quadrivalent - High Dose - PF (65 years and older) 09/16/2021    Influenza - Trivalent - Fluzone High Dose - PF (65 years and older) 11/13/2017, 09/21/2018, 09/16/2019     Influenza A (H1N1) 2009 Monovalent - IM 03/25/2010    Td - PF (ADULT) 09/15/1966    Tdap 09/17/2020    Zoster 10/21/2014        Medications/Allergies:       Current Outpatient Medications   Medication Instructions    albuterol (PROVENTIL) 2.5 mg, Nebulization, Every 6 hours PRN, Rescue    atorvastatin (LIPITOR) 30 mg, Daily    azelastine (ASTELIN) 137 mcg, Nasal, Daily    calcium carbonate (CALCIUM 600) 600 mg, Daily    cetirizine (ZYRTEC) 10 mg, Daily    clopidogreL (PLAVIX) 75 mg, Oral, Daily    fluticasone propionate (FLONASE) 100 mcg, Each Nostril, Daily    fluticasone-salmeterol diskus inhaler 100-50 mcg 1 puff, Inhalation, 2 times daily, Controller    milk thistle 500 mg Cap 1 Capful, Daily    multivit-min-FA-lycopen-lutein (CENTRUM SILVER) 0.4 mg-300 mcg- 250 mcg Tab 1 tablet, Daily    nitrofurantoin, macrocrystal-monohydrate, (MACROBID) 100 MG capsule 100 mg, Oral, 2 times daily    pantoprazole (PROTONIX) 40 mg, Daily    ursodioL (ACTIGALL) 300 mg capsule 1 capsule, Daily    vitamin D (VITAMIN D3) 1000 units Tab tablet       Review of patient's allergies indicates:   Allergen Reactions    Aspirin Anaphylaxis    Codeine Nausea Only    Sulfa (sulfonamide antibiotics) Nausea Only        Review of Systems:      ROS    Constitutional: denies fevers, chills, weight loss  HEENT: denies blurry/double vision, changes in hearing, odynophagia, dysphagia  Respiratory: denies cough, shortness of breath  Cardiovascular: denies palpitations, swelling of the extremities  GI: denies abdominal pain, nausea/vomiting, hematochezia, frequent stools  : denies frequency, dysuria, flank pain, hematuria  Skin: denies new rashes  Neurological: denies muscular/sensory deficiencies, loss of coordination, headaches, memory changes  Endo: denies hair loss/thinning, nervousness, hot flashes, heat/cold intolerance, lumps in the neck area  Heme: denies easy bruising and fatigue  Psychological: denies anxious/depressive  "moods  Musculoskeletal: denies bony pain, muscle cramps, swollen joints       Objective/Physical Exam   Visit Vitals  /81 (BP Location: Left arm, Patient Position: Sitting)   Pulse 78   Temp (!) 45.9 °F (7.7 °C) (Oral)   Resp 18   Ht 5' 3" (1.6 m)   Wt 67.6 kg (149 lb)   SpO2 96%   BMI 26.39 kg/m²        Physical Exam    General: The patient is awake, alert and oriented. The patient is well nourished. No acute distress.  Neck: The neck is supple.   Musculoskeletal: The patient has a normal range of motion of her bilateral upper extremities.  Heart: Regular rate and rhythm, no murmurs.   Lung: No increased work of breathing. Clear breath sounds bilaterally.  Lymph nodes: There is no axillary, supraclavicular or cervical lymphadenopathy.  Breast:  Right:   On inspection there is no skin dimpling, rashes, retraction, erythema or skin abnormalities. The nipple areolar complex is normal with an everted nipple. The breasts move normally with movement of the pectoralis muscle. On palpation there are no dominant masses.  There is a small hematoma at the biopsy site in the 6:00 right breast.  There is no tenderness. There is no nipple discharge.  Left:   On inspection there is no skin dimpling, rashes, retraction, erythema or skin abnormalities. Well healed incision around the areola.  The nipple areolar complex is normal with an everted nipple. The breasts move normally with movement of the pectoralis muscle. On palpation there are no dominant masses. There is no tenderness. There is no nipple discharge.     Assessment and Plan     1. Atypical ductal hyperplasia of right breast  - Ambulatory referral/consult to Breast Surgery      Kristi DESIREE Garvey is a 85 y.o.female who presents with atypical ductal hyperplasia.  The abnormality on mammogram measures about 1cm in size and is located in the 6:00 breast posterior depth.  There is a coil clip at the site of ADH. We discussed that biopsies with atypia are considered high " risk lesions and are a pathologic diagnosis.  Surgical excision is recommended when ADH is diagnosed on core biopsy, based on studies showing that around 10-20% of surgical specimens at subsequent excision are positive for malignancy.  Given her age and the small size of the ADH, we did discuss the alternate option of continuing to monitor the area with close imaging follow up and the option of endocrine therapy.  However, the patient is anxious about the lesion and would prefer removal.  Excision will be guided by seed localization.  The patient is aware that if any abnormality is found on final pathology, additional surgery and/or other treatment might be recommended. Consent was signed and all questions answered.      Karine Garvey MD  Breast Surgical Oncology       I spent a total of 45 minutes on the day of the visit.  This includes face to face time and non-face to face time preparing to see the patient (eg, review of tests), obtaining and/or reviewing separately obtained history, documenting clinical information in the electronic or other health record, independently interpreting results and communicating results to the patient/family/caregiver, or care coordinator.           [1]   Social History  Socioeconomic History    Marital status: Single   Tobacco Use    Smoking status: Never    Smokeless tobacco: Never   Substance and Sexual Activity    Alcohol use: Not Currently    Drug use: Never     Social Drivers of Health     Financial Resource Strain: Low Risk  (2/26/2025)    Overall Financial Resource Strain (CARDIA)     Difficulty of Paying Living Expenses: Not hard at all   Food Insecurity: No Food Insecurity (2/26/2025)    Hunger Vital Sign     Worried About Running Out of Food in the Last Year: Never true     Ran Out of Food in the Last Year: Never true   Transportation Needs: No Transportation Needs (2/26/2025)    PRAPARE - Transportation     Lack of Transportation (Medical): No     Lack of Transportation  (Non-Medical): No   Physical Activity: Sufficiently Active (2/26/2025)    Exercise Vital Sign     Days of Exercise per Week: 5 days     Minutes of Exercise per Session: 50 min   Stress: No Stress Concern Present (2/26/2025)    [employer] - Occupational Stress Questionnaire     Feeling of Stress : Not at all   Housing Stability: Low Risk  (2/26/2025)    Housing Stability Vital Sign     Unable to Pay for Housing in the Last Year: No     Number of Times Moved in the Last Year: 0     Homeless in the Last Year: No

## 2025-05-12 ENCOUNTER — HOSPITAL ENCOUNTER (OUTPATIENT)
Dept: RADIOLOGY | Facility: HOSPITAL | Age: 85
Discharge: HOME | End: 2025-05-12
Attending: STUDENT IN AN ORGANIZED HEALTH CARE EDUCATION/TRAINING PROGRAM
Payer: MEDICARE

## 2025-05-12 DIAGNOSIS — N60.81 OTHER BENIGN MAMMARY DYSPLASIAS OF RIGHT BREAST: ICD-10-CM

## 2025-05-12 PROCEDURE — 19285 PERQ DEV BREAST 1ST US IMAG: CPT | Mod: RT,,, | Performed by: RADIOLOGY

## 2025-05-12 PROCEDURE — 77061 BREAST TOMOSYNTHESIS UNI: CPT | Mod: 26,RT,, | Performed by: RADIOLOGY

## 2025-05-12 PROCEDURE — 77065 DX MAMMO INCL CAD UNI: CPT | Mod: TC,RT

## 2025-05-12 PROCEDURE — 77065 DX MAMMO INCL CAD UNI: CPT | Mod: 26,RT,, | Performed by: RADIOLOGY

## 2025-05-12 PROCEDURE — 19285 PERQ DEV BREAST 1ST US IMAG: CPT | Mod: RT

## 2025-05-16 ENCOUNTER — TELEPHONE (OUTPATIENT)
Dept: INTERNAL MEDICINE | Facility: CLINIC | Age: 85
End: 2025-05-16
Payer: MEDICARE

## 2025-05-16 NOTE — TELEPHONE ENCOUNTER
Copied from CRM #[id number]. Topic: General Inquiry - Patient Advice  >> May 15, 2025  3:01 PM Radha wrote:  Who Called: Erin with the breast clinic    Caller is requesting assistance/information from provider's office.    Symptoms (please be specific):    How long has patient had these symptoms:    List of preferred pharmacies on file (remove unneeded): [unfilled]  If different, enter pharmacy into here including location and phone number:       Preferred Method of Contact: Phone Call  Patient's Preferred Phone Number on File: [phone]   Best Call Back Number, if different: [phone]  Additional Information: stated that the pt is needing a sx for a breast biopsy. Stated that she sent over the sx clearance on April 24 and haven't received it back. Stated that her sx is scheduled for May 27. Please advise

## 2025-05-19 ENCOUNTER — TELEPHONE (OUTPATIENT)
Dept: INTERNAL MEDICINE | Facility: CLINIC | Age: 85
End: 2025-05-19
Payer: MEDICARE

## 2025-05-19 NOTE — TELEPHONE ENCOUNTER
Copied from CRM #[id number]. Topic: General Inquiry - Patient Advice  >> May 19, 2025 12:57 PM Zoltan wrote:  Who Called: Kristi Garvey    Patient is returning phone call    Who Left Message for Patient:  Does the patient know what this is regarding?:      Preferred Method of Contact: Phone Call  Patient's Preferred Phone Number on File: [phone]   Best Call Back Number, if different:  Additional Information: pt stated she missed a call, not sure what call was in reference to

## 2025-05-19 NOTE — TELEPHONE ENCOUNTER
Spoke with pt regarding returning a missed call, informed pt the call was regarding surgery clearance

## 2025-05-27 ENCOUNTER — HOSPITAL ENCOUNTER (OUTPATIENT)
Facility: HOSPITAL | Age: 85
Discharge: HOME | End: 2025-05-27
Attending: STUDENT IN AN ORGANIZED HEALTH CARE EDUCATION/TRAINING PROGRAM | Admitting: STUDENT IN AN ORGANIZED HEALTH CARE EDUCATION/TRAINING PROGRAM
Payer: MEDICARE

## 2025-05-27 ENCOUNTER — ANESTHESIA (OUTPATIENT)
Dept: SURGERY | Facility: HOSPITAL | Age: 85
End: 2025-05-27
Payer: MEDICARE

## 2025-05-27 ENCOUNTER — ANESTHESIA EVENT (OUTPATIENT)
Dept: SURGERY | Facility: HOSPITAL | Age: 85
End: 2025-05-27
Payer: MEDICARE

## 2025-05-27 DIAGNOSIS — N60.91 ATYPICAL DUCTAL HYPERPLASIA OF RIGHT BREAST: Primary | ICD-10-CM

## 2025-05-27 DIAGNOSIS — Z01.818 PRE-OP TESTING: ICD-10-CM

## 2025-05-27 PROCEDURE — 36000707: Performed by: STUDENT IN AN ORGANIZED HEALTH CARE EDUCATION/TRAINING PROGRAM

## 2025-05-27 PROCEDURE — 63600175 PHARM REV CODE 636 W HCPCS: Performed by: STUDENT IN AN ORGANIZED HEALTH CARE EDUCATION/TRAINING PROGRAM

## 2025-05-27 PROCEDURE — 99499 UNLISTED E&M SERVICE: CPT | Mod: ,,,

## 2025-05-27 PROCEDURE — 63600175 PHARM REV CODE 636 W HCPCS

## 2025-05-27 PROCEDURE — 25000003 PHARM REV CODE 250

## 2025-05-27 PROCEDURE — 71000033 HC RECOVERY, INTIAL HOUR: Performed by: STUDENT IN AN ORGANIZED HEALTH CARE EDUCATION/TRAINING PROGRAM

## 2025-05-27 PROCEDURE — 19125 EXCISION BREAST LESION: CPT | Mod: RT,,, | Performed by: STUDENT IN AN ORGANIZED HEALTH CARE EDUCATION/TRAINING PROGRAM

## 2025-05-27 PROCEDURE — 71000016 HC POSTOP RECOV ADDL HR: Performed by: STUDENT IN AN ORGANIZED HEALTH CARE EDUCATION/TRAINING PROGRAM

## 2025-05-27 PROCEDURE — 71000015 HC POSTOP RECOV 1ST HR: Performed by: STUDENT IN AN ORGANIZED HEALTH CARE EDUCATION/TRAINING PROGRAM

## 2025-05-27 PROCEDURE — 37000009 HC ANESTHESIA EA ADD 15 MINS: Performed by: STUDENT IN AN ORGANIZED HEALTH CARE EDUCATION/TRAINING PROGRAM

## 2025-05-27 PROCEDURE — 36000706: Performed by: STUDENT IN AN ORGANIZED HEALTH CARE EDUCATION/TRAINING PROGRAM

## 2025-05-27 PROCEDURE — 37000008 HC ANESTHESIA 1ST 15 MINUTES: Performed by: STUDENT IN AN ORGANIZED HEALTH CARE EDUCATION/TRAINING PROGRAM

## 2025-05-27 PROCEDURE — 25000003 PHARM REV CODE 250: Performed by: STUDENT IN AN ORGANIZED HEALTH CARE EDUCATION/TRAINING PROGRAM

## 2025-05-27 RX ORDER — BUPIVACAINE HYDROCHLORIDE 5 MG/ML
INJECTION, SOLUTION EPIDURAL; INTRACAUDAL; PERINEURAL
Status: DISCONTINUED | OUTPATIENT
Start: 2025-05-27 | End: 2025-05-27 | Stop reason: HOSPADM

## 2025-05-27 RX ORDER — BUPIVACAINE HYDROCHLORIDE 5 MG/ML
INJECTION, SOLUTION EPIDURAL; INTRACAUDAL; PERINEURAL
Status: DISCONTINUED
Start: 2025-05-27 | End: 2025-05-27 | Stop reason: HOSPADM

## 2025-05-27 RX ORDER — SODIUM CHLORIDE, SODIUM LACTATE, POTASSIUM CHLORIDE, CALCIUM CHLORIDE 600; 310; 30; 20 MG/100ML; MG/100ML; MG/100ML; MG/100ML
INJECTION, SOLUTION INTRAVENOUS CONTINUOUS
Status: DISCONTINUED | OUTPATIENT
Start: 2025-05-27 | End: 2025-05-27 | Stop reason: HOSPADM

## 2025-05-27 RX ORDER — TRAMADOL HYDROCHLORIDE 50 MG/1
50 TABLET, FILM COATED ORAL EVERY 6 HOURS PRN
Qty: 12 TABLET | Refills: 0 | Status: SHIPPED | OUTPATIENT
Start: 2025-05-27 | End: 2025-05-30

## 2025-05-27 RX ORDER — ONDANSETRON HYDROCHLORIDE 2 MG/ML
4 INJECTION, SOLUTION INTRAVENOUS EVERY 12 HOURS PRN
Status: DISCONTINUED | OUTPATIENT
Start: 2025-05-27 | End: 2025-05-27 | Stop reason: HOSPADM

## 2025-05-27 RX ORDER — PHENYLEPHRINE HYDROCHLORIDE 10 MG/ML
INJECTION INTRAVENOUS
Status: DISCONTINUED | OUTPATIENT
Start: 2025-05-27 | End: 2025-05-27

## 2025-05-27 RX ORDER — ONDANSETRON HYDROCHLORIDE 2 MG/ML
INJECTION, SOLUTION INTRAMUSCULAR; INTRAVENOUS
Status: DISCONTINUED | OUTPATIENT
Start: 2025-05-27 | End: 2025-05-27

## 2025-05-27 RX ORDER — PROPOFOL 10 MG/ML
VIAL (ML) INTRAVENOUS
Status: DISCONTINUED | OUTPATIENT
Start: 2025-05-27 | End: 2025-05-27

## 2025-05-27 RX ORDER — ROCURONIUM BROMIDE 10 MG/ML
INJECTION, SOLUTION INTRAVENOUS
Status: DISCONTINUED | OUTPATIENT
Start: 2025-05-27 | End: 2025-05-27

## 2025-05-27 RX ORDER — CEFAZOLIN SODIUM 1 G/3ML
INJECTION, POWDER, FOR SOLUTION INTRAMUSCULAR; INTRAVENOUS
Status: DISCONTINUED
Start: 2025-05-27 | End: 2025-05-27 | Stop reason: HOSPADM

## 2025-05-27 RX ORDER — GLYCOPYRROLATE 0.2 MG/ML
INJECTION INTRAMUSCULAR; INTRAVENOUS
Status: DISCONTINUED | OUTPATIENT
Start: 2025-05-27 | End: 2025-05-27

## 2025-05-27 RX ORDER — CEFAZOLIN SODIUM 1 G/3ML
2 INJECTION, POWDER, FOR SOLUTION INTRAMUSCULAR; INTRAVENOUS
Status: COMPLETED | OUTPATIENT
Start: 2025-05-27 | End: 2025-05-27

## 2025-05-27 RX ORDER — FENTANYL CITRATE 50 UG/ML
INJECTION, SOLUTION INTRAMUSCULAR; INTRAVENOUS
Status: DISCONTINUED | OUTPATIENT
Start: 2025-05-27 | End: 2025-05-27

## 2025-05-27 RX ORDER — HYDROMORPHONE HYDROCHLORIDE 2 MG/ML
0.2 INJECTION, SOLUTION INTRAMUSCULAR; INTRAVENOUS; SUBCUTANEOUS EVERY 5 MIN PRN
Refills: 0 | Status: ACTIVE | OUTPATIENT
Start: 2025-05-27 | End: 2025-05-27

## 2025-05-27 RX ORDER — ONDANSETRON HYDROCHLORIDE 2 MG/ML
4 INJECTION, SOLUTION INTRAVENOUS ONCE AS NEEDED
Status: DISCONTINUED | OUTPATIENT
Start: 2025-05-27 | End: 2025-05-27 | Stop reason: HOSPADM

## 2025-05-27 RX ORDER — SODIUM CHLORIDE 0.9 % (FLUSH) 0.9 %
10 SYRINGE (ML) INJECTION
Status: DISCONTINUED | OUTPATIENT
Start: 2025-05-27 | End: 2025-05-27 | Stop reason: HOSPADM

## 2025-05-27 RX ORDER — GLUCAGON 1 MG
1 KIT INJECTION
Status: DISCONTINUED | OUTPATIENT
Start: 2025-05-27 | End: 2025-05-27 | Stop reason: HOSPADM

## 2025-05-27 RX ORDER — DEXAMETHASONE SODIUM PHOSPHATE 4 MG/ML
INJECTION, SOLUTION INTRA-ARTICULAR; INTRALESIONAL; INTRAMUSCULAR; INTRAVENOUS; SOFT TISSUE
Status: DISCONTINUED | OUTPATIENT
Start: 2025-05-27 | End: 2025-05-27

## 2025-05-27 RX ADMIN — DEXAMETHASONE SODIUM PHOSPHATE 8 MG: 4 INJECTION, SOLUTION INTRA-ARTICULAR; INTRALESIONAL; INTRAMUSCULAR; INTRAVENOUS; SOFT TISSUE at 09:05

## 2025-05-27 RX ADMIN — ROCURONIUM BROMIDE 50 MG: 10 INJECTION, SOLUTION INTRAVENOUS at 09:05

## 2025-05-27 RX ADMIN — SODIUM CHLORIDE 10 ML/HR: 9 INJECTION, SOLUTION INTRAVENOUS at 08:05

## 2025-05-27 RX ADMIN — FENTANYL CITRATE 25 MCG: 50 INJECTION, SOLUTION INTRAMUSCULAR; INTRAVENOUS at 09:05

## 2025-05-27 RX ADMIN — FENTANYL CITRATE 50 MCG: 50 INJECTION, SOLUTION INTRAMUSCULAR; INTRAVENOUS at 09:05

## 2025-05-27 RX ADMIN — ONDANSETRON 4 MG: 2 INJECTION INTRAMUSCULAR; INTRAVENOUS at 09:05

## 2025-05-27 RX ADMIN — PHENYLEPHRINE HYDROCHLORIDE 100 MCG: 10 INJECTION INTRAVENOUS at 09:05

## 2025-05-27 RX ADMIN — PROPOFOL 150 MG: 10 INJECTION, EMULSION INTRAVENOUS at 09:05

## 2025-05-27 RX ADMIN — SODIUM CHLORIDE: 9 INJECTION, SOLUTION INTRAVENOUS at 10:05

## 2025-05-27 RX ADMIN — SUGAMMADEX 200 MG: 100 INJECTION, SOLUTION INTRAVENOUS at 10:05

## 2025-05-27 RX ADMIN — CEFAZOLIN 2 G: 330 INJECTION, POWDER, FOR SOLUTION INTRAMUSCULAR; INTRAVENOUS at 09:05

## 2025-05-27 RX ADMIN — GLYCOPYRROLATE 0.2 MG: 0.2 INJECTION INTRAMUSCULAR; INTRAVENOUS at 09:05

## 2025-05-27 NOTE — ANESTHESIA PROCEDURE NOTES
Intubation    Date/Time: 5/27/2025 9:07 AM    Performed by: Kandace Marte CRNA  Authorized by: Carroll Ruano MD    Intubation:     Induction:  Intravenous    Intubated:  Postinduction    Mask Ventilation:  Easy mask    Attempts:  1    Attempted By:  CRNA    Method of Intubation:  Direct    Blade:  Mora 2    Laryngeal View Grade: Grade I - full view of cords      Difficult Airway Encountered?: No      Complications:  None    Airway Device:  Oral endotracheal tube    Airway Device Size:  7.0    Style/Cuff Inflation:  Cuffed (inflated to minimal occlusive pressure)    Tube secured:  22    Secured at:  The teeth    Placement Verified By:  Capnometry    Complicating Factors:  None    Findings Post-Intubation:  BS equal bilateral and atraumatic/condition of teeth unchanged

## 2025-05-27 NOTE — ANESTHESIA PREPROCEDURE EVALUATION
05/27/2025  Kristi Garvey is a 85 y.o., female.  Procedure Information    Case: [id number] Date/Time: 05/27/25 0845   Procedure: EXCISION,MASS,BREAST,USING RADIOLOGICAL MARKER - [employer] Localization // Right / Need US / [employer] / [employer] (Right) - need ultrasound, [employer], SENTIMAG   Anesthesia type: General   Diagnosis: Atypical ductal hyperplasia of right breast [N60.91]   Pre-op diagnosis: Atypical ductal hyperplasia of right breast [N60.91]   Location: [facility] OR 04 / [facility] OR   Surgeons: Karine Garvey MD       Pre-op Assessment    I have reviewed the Patient Summary Reports.     I have reviewed the Nursing Notes. I have reviewed the NPO Status.   I have reviewed the Medications.     Review of Systems  Anesthesia Hx:  No problems with previous Anesthesia                Hematology/Oncology:  Hematology Normal   Oncology Normal                                   EENT/Dental:  EENT/Dental Normal           Cardiovascular:  Cardiovascular Normal Exercise tolerance: good                     Functional Capacity good / => 4 METS                         Pulmonary:    Asthma asymptomatic and mild                   Renal/:   Denies Chronic Renal Disease.                Hepatic/GI:     GERD Liver Disease, Hepatitis              Musculoskeletal:  Musculoskeletal Normal                Neurological:  TIA,                                     Endocrine:  Endocrine Normal          Denies Morbid Obesity / BMI > 40  Dermatological:  Skin Normal    Psych:  Psychiatric Normal                    Physical Exam  General: Alert, Oriented, Well nourished and Cooperative    Airway:  Mallampati: II   Mouth Opening: Normal  TM Distance: Normal  Tongue: Normal  Neck ROM: Normal ROM    Dental:  Intact    Chest/Lungs:  Clear to auscultation, Normal Respiratory Rate    Heart:  Rate: Normal  Rhythm: Regular Rhythm       Latest Reference  Range & Units 02/14/25 09:39   WBC 4.50 - 11.50 x10(3)/mcL 4.98   RBC 4.20 - 5.40 x10(6)/mcL 5.04   Hemoglobin 12.0 - 16.0 g/dL 15.3   Hematocrit 37.0 - 47.0 % 47.3 (H)   MCV 80.0 - 94.0 fL 93.8   MCH 27.0 - 31.0 pg 30.4   MCHC 33.0 - 36.0 g/dL 32.3 (L)   RDW 11.5 - 17.0 % 13.1   Platelet Count 130 - 400 x10(3)/mcL 215   MPV 7.4 - 10.4 fL 9.6   Neut % % 48.2   LYMPH % % 41.4   Mono % % 5.4   Eos % % 4.0   Basophil % % 0.8   Immature Granulocytes % 0.2   Neut # 2.1 - 9.2 x10(3)/mcL 2.40   Lymph # 0.6 - 4.6 x10(3)/mcL 2.06   Mono # 0.1 - 1.3 x10(3)/mcL 0.27   Eos # 0 - 0.9 x10(3)/mcL 0.20   Baso # <=0.2 x10(3)/mcL 0.04   Immature Grans (Abs) 0.00 - 0.04 x10(3)/mcL 0.01   nRBC % 0.0   Sodium 136 - 145 mmol/L 142   Potassium 3.5 - 5.1 mmol/L 3.9   Chloride 98 - 107 mmol/L 107   CO2 23 - 31 mmol/L 29   Anion Gap mEq/L 6.0   BUN 9.8 - 20.1 mg/dL 13.5   Creatinine 0.55 - 1.02 mg/dL 0.73   BUN/CREAT RATIO  18   eGFR mL/min/1.73/m2 >60   Glucose 82 - 115 mg/dL 97   Calcium 8.4 - 10.2 mg/dL 10.1   ALP 40 - 150 unit/L 85   PROTEIN TOTAL 5.8 - 7.6 gm/dL 6.7   Albumin 3.4 - 4.8 g/dL 4.0   Albumin/Globulin Ratio 1.1 - 2.0 ratio 1.5   BILIRUBIN TOTAL <=1.5 mg/dL 1.0   AST 5 - 34 unit/L 18   ALT 0 - 55 unit/L 22   Globulin, Total 2.4 - 3.5 gm/dL 2.7   Cholesterol Total <=200 mg/dL 194   HDL 35 - 60 mg/dL 57   Total Cholesterol/HDL Ratio 0 - 5  3   Triglycerides 37 - 140 mg/dL 113   LDL Cholesterol 50.00 - 140.00 mg/dL 114.00   Very Low Density Lipoprotein  23   Vitamin D 30 - 80 ng/mL 67   Color, UA Yellow, Light-Yellow, Colorless, Straw, Dark-Yellow  Colorless   Appearance, UA Clear  Clear   Specific Gravity,UA 1.005 - 1.030  1.004 (L)   pH, UA 5.0 - 8.5  7.0   Protein, UA Negative  Negative   Glucose, UA Negative, Normal  Normal   Ketones, UA Negative  Negative   Blood, UA Negative  Negative   NITRITE UA Negative  Negative   Bilirubin (UA) Negative  Negative   Urobilinogen, UA 0.2, 1.0, Normal  Normal   Leukocyte Esterase, UA  Negative  Negative   RBC, UA None Seen, 0-2, 3-5, 0-5 /HPF None Seen   WBC, UA None Seen, 0-2, 3-5, 0-5 /HPF 0-5   Bacteria, UA None Seen, Trace /HPF Trace   Squamous Epithelial Cells, UA None Seen, Trace, Rare /HPF Trace   (H): Data is abnormally high  (L): Data is abnormally low  Test Reason : Z01.818,    Vent. Rate :  63 BPM     Atrial Rate :  63 BPM     P-R Int : 222 ms          QRS Dur :  88 ms      QT Int : 396 ms       P-R-T Axes :  75  65  61 degrees    QTcB Int : 405 ms    Sinus rhythm with 1st degree A-V block  Otherwise normal ECG  No previous ECGs available  Confirmed by Darnell Armenta ([id number]) on 4/24/2025 12:33:54 PM    Referred By: JAJA DIAZ           Confirmed By: Darnell Armenta     Specimen Collected: 04/24/25 09:14 CDT       Anesthesia Plan  Type of Anesthesia, risks & benefits discussed:    Anesthesia Type: Gen ETT  Intra-op Monitoring Plan: Standard ASA Monitors  Post Op Pain Control Plan: multimodal analgesia  Induction:  IV and Inhalation  Airway Plan: Direct  Informed Consent: Informed consent signed with the Patient and all parties understand the risks and agree with anesthesia plan.  All questions answered. Patient consented to blood products? Yes  ASA Score: 3  Day of Surgery Review of History & Physical: H&P Update referred to the surgeon/provider.I have interviewed and examined the patient. I have reviewed the patient's H&P dated: There are no significant changes.     Ready For Surgery From Anesthesia Perspective.     .

## 2025-05-27 NOTE — ANESTHESIA POSTPROCEDURE EVALUATION
Anesthesia Post Evaluation    Patient: Kristi Garvey    Procedure(s) Performed: Procedure(s) (LRB):  EXCISION,MASS,BREAST,USING RADIOLOGICAL MARKER - [employer] Localization // Right / Need US / [employer] / [employer] (Right)    Final Anesthesia Type: general      Patient location during evaluation: PACU  Patient participation: Yes- Able to Participate  Level of consciousness: awake and alert and oriented  Post-procedure vital signs: reviewed and stable  Pain management: adequate  Airway patency: patent  CLARI mitigation strategies: Verification of full reversal of neuromuscular block  PONV status at discharge: No PONV  Anesthetic complications: no      Cardiovascular status: blood pressure returned to baseline and stable  Respiratory status: spontaneous ventilation and unassisted  Hydration status: euvolemic  Follow-up not needed.  Comments: [facility]              Vitals Value Taken Time   /77 05/27/25 11:51   Temp 36.3 °C (97.3 °F) 05/27/25 11:05   Pulse 70 05/27/25 11:55   Resp 18 05/27/25 11:05   SpO2 97 % 05/27/25 11:55         Event Time   Out of Recovery 11:03:00         Pain/Carlyle Score: Carlyle Score: 10 (5/27/2025 11:05 AM)  Modified Carlyle Score: 20 (5/27/2025 12:40 PM)

## 2025-05-27 NOTE — TRANSFER OF CARE
Anesthesia Transfer of Care Note    Patient: Kristi Garvey    Procedure(s) Performed: Procedure(s) (LRB):  EXCISION,MASS,BREAST,USING RADIOLOGICAL MARKER - [employer] Localization // Right / Need US / [employer] / [employer] (Right)    Patient location: PACU    Anesthesia Type: general    Transport from OR: Transported from OR on room air with adequate spontaneous ventilation    Post pain: adequate analgesia    Post assessment: no apparent anesthetic complications    Post vital signs: stable    Level of consciousness: awake    Nausea/Vomiting: no nausea/vomiting    Complications: none    Transfer of care protocol was followed      Last vitals: Visit Vitals  /66   Pulse 71   Temp 36.2 °C (97.2 °F) (Oral)   Resp (!) 21   Wt 67.6 kg (149 lb 0.5 oz)   SpO2 99%   Breastfeeding No   BMI 26.40 kg/m²

## 2025-05-28 VITALS
DIASTOLIC BLOOD PRESSURE: 77 MMHG | BODY MASS INDEX: 26.4 KG/M2 | SYSTOLIC BLOOD PRESSURE: 136 MMHG | HEART RATE: 70 BPM | RESPIRATION RATE: 18 BRPM | OXYGEN SATURATION: 97 % | TEMPERATURE: 97 F | WEIGHT: 149.06 LBS

## 2025-05-28 DIAGNOSIS — J06.9 UPPER RESPIRATORY TRACT INFECTION, UNSPECIFIED TYPE: ICD-10-CM

## 2025-05-28 RX ORDER — FLUTICASONE PROPIONATE 50 MCG
SPRAY, SUSPENSION (ML) NASAL
Qty: 48 ML | Refills: 1 | Status: SHIPPED | OUTPATIENT
Start: 2025-05-28

## 2025-06-04 LAB — PSYCHE PATHOLOGY RESULT: NORMAL

## 2025-06-05 ENCOUNTER — OFFICE VISIT (OUTPATIENT)
Dept: SURGERY | Facility: CLINIC | Age: 85
End: 2025-06-05
Payer: MEDICARE

## 2025-06-05 VITALS
HEIGHT: 63 IN | BODY MASS INDEX: 26.64 KG/M2 | RESPIRATION RATE: 18 BRPM | SYSTOLIC BLOOD PRESSURE: 118 MMHG | OXYGEN SATURATION: 97 % | DIASTOLIC BLOOD PRESSURE: 70 MMHG | TEMPERATURE: 98 F | HEART RATE: 74 BPM | WEIGHT: 150.38 LBS

## 2025-06-05 DIAGNOSIS — Z09 POSTOP CHECK: Primary | ICD-10-CM

## 2025-06-05 DIAGNOSIS — D05.11 DUCTAL CARCINOMA IN SITU (DCIS) OF RIGHT BREAST: ICD-10-CM

## 2025-06-05 PROCEDURE — 99999 PR PBB SHADOW E&M-EST. PATIENT-LVL IV: CPT | Mod: PBBFAC,,, | Performed by: STUDENT IN AN ORGANIZED HEALTH CARE EDUCATION/TRAINING PROGRAM

## 2025-06-05 PROCEDURE — 99214 OFFICE O/P EST MOD 30 MIN: CPT | Mod: PBBFAC | Performed by: STUDENT IN AN ORGANIZED HEALTH CARE EDUCATION/TRAINING PROGRAM

## 2025-06-20 NOTE — PROGRESS NOTES
Referring physician: Dr. Karine Garvey  Reason for referral: DCIS      Subjective:       Patient ID: Kristi Garvey is a 85 y.o. female.    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  Surgery/pathology:  25--Right breast excisional biopsy done 25--DCIS, intermediate grade 8mm, clear margins, ER 94.2%.  Imagin. Screening MMG 3/16/25--Right breast 6:00 axis posterior depth calcifications are indeterminate and need further evaluation. BI-RADS 0: Incomplete. Need additional imaging evaluation. Right diagnostic mammography with magnifications views is recommended.   2. Diagnostic MMG done 3/18/25--Heterogeneous calcifications in a grouped/linear distribution spanning 10 mm in the 6:00 right breast, posterior depth, are suspicious.  A right breast tomosynthesis/stereotactic guided core needle biopsy is recommended, BI-RADS 4.    Current treatment plan:   1. Consultation for consideration of adjuvant RT  2. Arimidex X 5 years     Chief Complaint: Other Misc (NPO)    HPI  86 yo female with h/o TIA on Plavix, h/o autoimmune hepatitis,  found on screening MMG to have right breast posterior depth calcifications indeterminate needing further evaluation. Patient underwent right diagnostic MMG and showed heterogeneous group of calcifications spanning 10mm in 6:00 right breast suspicious. Patient underwent biopsy and findings showed ADH. Patient seen by Dr. Garvey and excision recommended. Patient underwent right breast excisional biopsy and findings returned with DCIS, Grade 2, 8mm, clear margins, ER positive. Patient has been referred to me for recommendations for adjuvant treatment. Patient reports menarche age 12, , age at first live birth 23, MARICARMEN/BSO age 55, currently postmenopausal. She did take HRT x 5 years. FH positive for brother with lymphoma, brother with multiple myeloma, brother with prostate cancer, maternal aunt  with cancer, unknown type. Patient presents for initial visit with her daughter in law. She is doing very well since surgery, says she had little pain. She is very active for her age, continues driving, lives alone and takes care of all her ADLs. Her son and daughter in law live next door. She lost her  9 years ago, he was sick with leukemia, treated by Dr. Viera. Discussed treatment with AI and she is in agreement.     Past Medical History:   Diagnosis Date    Amenorrhea 1953    Arthritis     Breast mass, right     Cholangiolitis     autoimmune liver    GERD (gastroesophageal reflux disease)     Hormone disorder 1960s    Infertility, female     1960s    Menopause     Early 1980s    Menstrual symptom or sign     1953    Mild intermittent asthma, uncomplicated     Mixed hyperlipidemia     Osteoporosis     Pregnancy 1963; 1965; 1971    TIA (transient ischemic attack) 2019    Urinary incontinence     Around 2000    Vitamin D deficiency       Past Surgical History:   Procedure Laterality Date    ABDOMINAL SURGERY      BLADDER SUSPENSION      BREAST BIOPSY      BREAST SURGERY  In 1986  and in 2025    and 2025    CATARACT EXTRACTION Bilateral     CHOLECYSTECTOMY      COLPORRHAPHY  2005    COLPOSCOPY      Several times    EXCISION, MASS, BREAST, USING RADIOLOGICAL MARKER Right 05/27/2025    Procedure: EXCISION,MASS,BREAST,USING RADIOLOGICAL MARKER - [employer] Localization // Right / Need US / [employer] / [employer];  Surgeon: Karine Garvey MD;  Location: [facility] OR;  Service: General;  Laterality: Right;  need ultrasound, [facility], S[facility]IMAG    EYE SURGERY  2010    FOOT NEUROMA SURGERY Left     HERNIA REPAIR      HYSTERECTOMY      OOPHORECTOMY  2005     Family History   Problem Relation Name Age of Onset    Hypertension Father Nara Strauss     Kidney disease Father Nara Strauss     Lymphoma Brother Mamadou 70 - 79    Cancer Brother Mamadou     Hypertension Brother Mamadou     Multiple myeloma Brother Damian 65 - 69    Cancer  Brother Damian     Prostate cancer Brother Willard 50 - 59    Cancer Brother Willard     Cancer Maternal Aunt Ping Bingham 70 - 79    Arthritis Mother Ermelinda Strauss     Osteoarthritis Mother Ermelinda Strauss     Seizures Mother Ermelinda Strauss     Hypertension Brother Meir Strauss     Seizures Sister Aparna Huerta      Social History     Socioeconomic History    Marital status: Single   Tobacco Use    Smoking status: Never    Smokeless tobacco: Never   Vaping Use    Vaping status: Never Used   Substance and Sexual Activity    Alcohol use: Never    Drug use: Never    Sexual activity: Not Currently     Partners: Male     Comment: I had sex only with my      Social Drivers of Health     Financial Resource Strain: Low Risk  (2/26/2025)    Overall Financial Resource Strain (CARDIA)     Difficulty of Paying Living Expenses: Not hard at all   Food Insecurity: No Food Insecurity (2/26/2025)    Hunger Vital Sign     Worried About Running Out of Food in the Last Year: Never true     Ran Out of Food in the Last Year: Never true   Transportation Needs: No Transportation Needs (2/26/2025)    PRAPARE - Transportation     Lack of Transportation (Medical): No     Lack of Transportation (Non-Medical): No   Physical Activity: Sufficiently Active (2/26/2025)    Exercise Vital Sign     Days of Exercise per Week: 5 days     Minutes of Exercise per Session: 50 min   Stress: No Stress Concern Present (2/26/2025)    [employer] - Occupational Stress Questionnaire     Feeling of Stress : Not at all   Housing Stability: Low Risk  (2/26/2025)    Housing Stability Vital Sign     Unable to Pay for Housing in the Last Year: No     Number of Times Moved in the Last Year: 0     Homeless in the Last Year: No       Review of patient's allergies indicates:   Allergen Reactions    Aspirin Anaphylaxis    Codeine Nausea Only    Sulfa (sulfonamide antibiotics) Nausea Only      Medications Ordered Prior to Encounter[1]   Review of  "Systems   Constitutional:  Negative for appetite change and unexpected weight change.   HENT:  Negative for mouth sores.    Eyes:  Negative for visual disturbance.   Respiratory:  Positive for cough. Negative for shortness of breath.    Cardiovascular:  Negative for chest pain.   Gastrointestinal:  Negative for abdominal pain and diarrhea.   Genitourinary:  Negative for frequency.   Musculoskeletal:  Negative for back pain.   Integumentary:  Negative for rash.   Neurological:  Negative for headaches.   Hematological:  Negative for adenopathy.   Psychiatric/Behavioral:  The patient is not nervous/anxious.               Vitals:    06/24/25 1410   BP: 137/78   Pulse: 67   Resp: 14   Temp: 97.5 °F (36.4 °C)   TempSrc: Oral   SpO2: 98%   Weight: 68 kg (150 lb)   Height: 5' 3" (1.6 m)      Physical Exam  Constitutional:       Appearance: Normal appearance.   HENT:      Head: Normocephalic.      Nose: Nose normal.      Mouth/Throat:      Mouth: Mucous membranes are moist.   Eyes:      Extraocular Movements: Extraocular movements intact.      Conjunctiva/sclera: Conjunctivae normal.   Cardiovascular:      Rate and Rhythm: Normal rate and regular rhythm.   Pulmonary:      Effort: Pulmonary effort is normal.      Breath sounds: Normal breath sounds.   Chest:      Comments: Right breast with inferior incision healed well  Abdominal:      General: Bowel sounds are normal. There is no distension.      Palpations: Abdomen is soft.      Tenderness: There is no abdominal tenderness.   Musculoskeletal:         General: Normal range of motion.   Skin:     General: Skin is warm.   Neurological:      General: No focal deficit present.      Mental Status: She is alert and oriented to person, place, and time.   Psychiatric:         Mood and Affect: Mood normal.         Judgment: Judgment normal.       Admission on 05/27/2025, Discharged on 05/27/2025   Component Date Value    Pathology Result 05/27/2025     Clinical Support on 04/24/2025 "   Component Date Value    QRS Duration 04/24/2025 88     [facility] QTC Calculation 04/24/2025 405    Hospital Outpatient Visit on 04/09/2025   Component Date Value    Pathology Result 04/09/2025     Appointment on 04/07/2025   Component Date Value    Color, UA 04/07/2025 Light-Yellow     Appearance, UA 04/07/2025 Turbid (A)     Specific [place], UA 04/07/2025 1.007     pH, UA 04/07/2025 6.5     Protein, UA 04/07/2025 Negative     Glucose, UA 04/07/2025 Normal     Ketones, UA 04/07/2025 Negative     Blood, UA 04/07/2025 1+ (A)     Bilirubin, UA 04/07/2025 Negative     Urobilinogen, UA 04/07/2025 Normal     Nitrites, UA 04/07/2025 Negative     Leukocyte Esterase, UA 04/07/2025 500 (A)     RBC, UA 04/07/2025 6-10 (A)     WBC, UA 04/07/2025 >100 (A)     WBC Clumps, UA 04/07/2025 Few (A)     Bacteria, UA 04/07/2025 Occasional (A)     Squamous Epithelial Cell* 04/07/2025 Trace     Transitional Epithelial * 04/07/2025 Trace (A)     Urine Culture 04/07/2025 >/= 100,000 colonies/ml Citrobacter koseri (A)    Lab Visit on 02/14/2025   Component Date Value    Sodium 02/14/2025 142     Potassium 02/14/2025 3.9     Chloride 02/14/2025 107     CO2 02/14/2025 29     Glucose 02/14/2025 97     Blood Urea Nitrogen 02/14/2025 13.5     Creatinine 02/14/2025 0.73     Calcium 02/14/2025 10.1     Protein Total 02/14/2025 6.7     Albumin 02/14/2025 4.0     Globulin 02/14/2025 2.7     Albumin/Globulin Ratio 02/14/2025 1.5     Bilirubin Total 02/14/2025 1.0     ALP 02/14/2025 85     ALT 02/14/2025 22     AST 02/14/2025 18     eGFR 02/14/2025 >60     Anion Gap 02/14/2025 6.0     BUN/Creatinine Ratio 02/14/2025 18     Cholesterol Total 02/14/2025 194     HDL Cholesterol 02/14/2025 57     Triglyceride 02/14/2025 113     Cholesterol/HDL Ratio 02/14/2025 3     Very Low Density Lipopro* 02/14/2025 23     LDL Cholesterol 02/14/2025 114.00     Color, UA 02/14/2025 Colorless     Appearance, UA 02/14/2025 Clear     Specific Gravity, UA 02/14/2025 1.004 (L)      pH, UA 02/14/2025 7.0     Protein, UA 02/14/2025 Negative     Glucose, UA 02/14/2025 Normal     Ketones, UA 02/14/2025 Negative     Blood, UA 02/14/2025 Negative     Bilirubin, UA 02/14/2025 Negative     Urobilinogen, UA 02/14/2025 Normal     Nitrites, UA 02/14/2025 Negative     Leukocyte Esterase, UA 02/14/2025 Negative     RBC, UA 02/14/2025 None Seen     WBC, UA 02/14/2025 0-5     Bacteria, UA 02/14/2025 Trace     Squamous Epithelial Cell* 02/14/2025 Trace     Vitamin D 02/14/2025 67     WBC 02/14/2025 4.98     RBC 02/14/2025 5.04     Hgb 02/14/2025 15.3     Hct 02/14/2025 47.3 (H)     MCV 02/14/2025 93.8     MCH 02/14/2025 30.4     MCHC 02/14/2025 32.3 (L)     RDW 02/14/2025 13.1     Platelet 02/14/2025 215     MPV 02/14/2025 9.6     Neut % 02/14/2025 48.2     Lymph % 02/14/2025 41.4     Mono % 02/14/2025 5.4     Eos % 02/14/2025 4.0     Basophil % 02/14/2025 0.8     Imm Grans % 02/14/2025 0.2     Neut # 02/14/2025 2.40     Lymph # 02/14/2025 2.06     Mono # 02/14/2025 0.27     Eos # 02/14/2025 0.20     Baso # 02/14/2025 0.04     Imm Gran # 02/14/2025 0.01     NRBC% 02/14/2025 0.0    Office Visit on 01/02/2025   Component Date Value    Influenza A PCR 01/02/2025 Not Detected     Influenza B PCR 01/02/2025 Not Detected     Respiratory Syncytial Vi* 01/02/2025 Not Detected     SARS-CoV-2 PCR 01/02/2025 Not Detected            Assessment:       1. Ductal carcinoma in situ (DCIS) of right breast         Plan:       Patient with right breast DCIS s/p lumpectomy done 5/27/25. DCIS measured 8mm, clear margins, Grade 2, ER+.  Recommendations per NCCN are for adjuvant RT and endocrine therapy.  Patient with advanced age, but she is very active for her age, had a normal bone density in 2020.     I have recommended treatment with Arimidex X 5 years.    I discussed most common side effects of aromatase inhibitor treatment including, but not limited to hot flashes, vaginal dryness, arthralgias and bone thinning.  Patient  given information from [facility] on Arimidex.     Patient is meeting with Dr. Arriaga to discuss RT on 7/7/25.     If no radiation is planned, patient instructed to start Arimidex.  If radiation is planned, recommend that she wait until after radiation is done/next visit to start.    F/u visit in 6 weeks for ongoing care.    Dr. Garvey will be ordering her follow-up imaging.    Discussed with patient usual excellent prognosis of DCIS with treatment given small size and favorable characteristics.     I have reviewed records from patient's DCIS diagnosis including biopsy/operative reports, pathology reports, imaging studies, and pathology special testing.  Total time spent reviewing records, current NCCN guidelines, as well as face-to-face interaction with patient and family was >60 minutes.    All questions answered at this time.    Eneida Flanagan MD       - code applied: patient requires or will require a continuous, longitudinal, and active collaborative plan of care related to this patient's health condition, DCIS--the management of which requires the direction of a practitioner with specialized clinical knowledge, skill, and expertise.                                  [1]   Current Outpatient Medications on File Prior to Visit   Medication Sig Dispense Refill    albuterol (PROVENTIL) 2.5 mg /3 mL (0.083 %) nebulizer solution Take 3 mLs (2.5 mg total) by nebulization every 6 (six) hours as needed for Wheezing. Rescue 28 each 11    atorvastatin (LIPITOR) 20 MG tablet Take 30 mg by mouth Daily.      azelastine (ASTELIN) 137 mcg (0.1 %) nasal spray 1 spray (137 mcg total) by Nasal route Daily. 30 mL 11    calcium carbonate (CALCIUM 600) 600 mg calcium (1,500 mg) Tab Take 600 mg by mouth once daily.      clopidogreL (PLAVIX) 75 mg tablet Take 1 tablet (75 mg total) by mouth once daily. 90 tablet 3    fluticasone propionate (FLONASE) 50 mcg/actuation nasal spray SPRAY 2 SPRAYS INTO EACH NOSTRIL EVERY DAY 48 mL  1    milk thistle 500 mg Cap Take 1 Capful by mouth once daily.      multivit-min-FA-lycopen-lutein (CENTRUM SILVER) 0.4 mg-300 mcg- 250 mcg Tab Take 1 tablet by mouth once daily.      omega-3/dha/epa/dpa/fish oil (OMEGA-3 2100 ORAL) Take by mouth.      pantoprazole (PROTONIX) 40 MG tablet Take 40 mg by mouth once daily.      ursodioL (ACTIGALL) 300 mg capsule Take 1 capsule by mouth once daily.      vitamin D (VITAMIN D3) 1000 units Tab tablet       No current facility-administered medications on file prior to visit.

## 2025-06-24 ENCOUNTER — OFFICE VISIT (OUTPATIENT)
Dept: HEMATOLOGY/ONCOLOGY | Facility: CLINIC | Age: 85
End: 2025-06-24
Payer: MEDICARE

## 2025-06-24 ENCOUNTER — CLINICAL SUPPORT (OUTPATIENT)
Dept: HEMATOLOGY/ONCOLOGY | Facility: CLINIC | Age: 85
End: 2025-06-24
Payer: MEDICARE

## 2025-06-24 VITALS
DIASTOLIC BLOOD PRESSURE: 78 MMHG | WEIGHT: 150 LBS | BODY MASS INDEX: 26.58 KG/M2 | OXYGEN SATURATION: 98 % | HEIGHT: 63 IN | RESPIRATION RATE: 14 BRPM | HEART RATE: 67 BPM | SYSTOLIC BLOOD PRESSURE: 137 MMHG | TEMPERATURE: 98 F

## 2025-06-24 DIAGNOSIS — D05.11 DUCTAL CARCINOMA IN SITU (DCIS) OF RIGHT BREAST: Primary | ICD-10-CM

## 2025-06-24 DIAGNOSIS — Z78.0 MENOPAUSE: ICD-10-CM

## 2025-06-24 PROCEDURE — 99211 OFF/OP EST MAY X REQ PHY/QHP: CPT | Mod: PBBFAC,27

## 2025-06-24 PROCEDURE — 99999 PR PBB SHADOW E&M-EST. PATIENT-LVL I: CPT | Mod: PBBFAC,,,

## 2025-06-24 PROCEDURE — G2211 COMPLEX E/M VISIT ADD ON: HCPCS | Mod: ,,, | Performed by: INTERNAL MEDICINE

## 2025-06-24 PROCEDURE — 99205 OFFICE O/P NEW HI 60 MIN: CPT | Mod: S$PBB,,, | Performed by: INTERNAL MEDICINE

## 2025-06-24 PROCEDURE — 99215 OFFICE O/P EST HI 40 MIN: CPT | Mod: PBBFAC | Performed by: INTERNAL MEDICINE

## 2025-06-24 PROCEDURE — 99999 PR PBB SHADOW E&M-EST. PATIENT-LVL V: CPT | Mod: PBBFAC,,, | Performed by: INTERNAL MEDICINE

## 2025-06-24 RX ORDER — ANASTROZOLE 1 MG/1
1 TABLET ORAL DAILY
Qty: 90 TABLET | Refills: 2 | Status: SHIPPED | OUTPATIENT
Start: 2025-06-24 | End: 2026-06-24

## 2025-06-24 NOTE — NURSING
"Oncology Navigation   Intake  Date of Diagnosis: 05/07/25  Cancer Type: Breast (DCIS)     Treatment  Current Status: Active    Surgery: Completed  Surgical Oncologist: Dr. Karine Garvey  Type of Surgery: Excision  Consult Date: 04/24/25  Surgery Schedule Date: 05/27/25    Medical Oncologist: Dr. Eneida Flanagan  Consult Date: 06/24/25  Oral Therapy: Planned             ER: Positive       Support Systems: Children; Family members  Barriers of Care: Barriers to Care "Assessment completed-no barriers noted"     Acuity      Follow Up  PRN     Met with patient today following her new patient appointment with Dr. Flanagan. She is accompanied by her daughter-in-law. Introduced self and role of navigation. Assessed for barriers to care, none noted. Answered all questions. Patient has no needs today. She is aware of how to reach navigation should she need to.   "

## 2025-06-27 ENCOUNTER — TELEPHONE (OUTPATIENT)
Dept: INTERNAL MEDICINE | Facility: CLINIC | Age: 85
End: 2025-06-27
Payer: MEDICARE

## 2025-06-27 DIAGNOSIS — E78.2 MIXED HYPERLIPIDEMIA: Primary | ICD-10-CM

## 2025-06-27 RX ORDER — ATORVASTATIN CALCIUM 20 MG/1
30 TABLET, FILM COATED ORAL DAILY
Qty: 135 TABLET | Refills: 3 | Status: SHIPPED | OUTPATIENT
Start: 2025-06-27

## 2025-06-27 NOTE — TELEPHONE ENCOUNTER
Copied from CRM #[id number]. Topic: General Inquiry - Patient Advice  >> Jun 27, 2025  8:53 AM Yeny wrote:  Who Called: Kristi Garvey    Refill or New Rx:New Rx  RX Name and Strength:atorvastatin (LIPITOR) 20 MG tablet  How is the patient currently taking it? (ex. 1XDay): Take 30 mg by mouth Daily. - Oral  Is this a 30 day or 90 day RX:90  Local or Mail Order:local  List of preferred pharmacies on file (remove unneeded): [facility] - [facility] AT PORTAL TO REGISTERED [facility] SITES [[id number]]  Ordering Provider:Dr. Prakash      Preferred Method of Contact: Phone Call  Patient's Preferred Phone Number on File: [phone]   Best Call Back Number, if different:  Additional Information:

## 2025-07-03 ENCOUNTER — TELEPHONE (OUTPATIENT)
Dept: INTERNAL MEDICINE | Facility: CLINIC | Age: 85
End: 2025-07-03
Payer: MEDICARE

## 2025-07-03 DIAGNOSIS — E78.2 MIXED HYPERLIPIDEMIA: ICD-10-CM

## 2025-07-03 RX ORDER — ATORVASTATIN CALCIUM 20 MG/1
30 TABLET, FILM COATED ORAL DAILY
Qty: 135 TABLET | Refills: 3 | Status: SHIPPED | OUTPATIENT
Start: 2025-07-03 | End: 2026-06-28

## 2025-07-03 NOTE — TELEPHONE ENCOUNTER
Copied from CRM #[id number]. Topic: General Inquiry - Patient Advice  >> Jul 3, 2025 10:13 AM Cirstopher Gutierrez wrote:  Who Called: Kristi Garvey    Caller is requesting assistance/information from provider's office.    Symptoms (please be specific): N/A   How long has patient had these symptoms:  N/A  List of preferred pharmacies on file (remove unneeded): [unfilled]  If different, enter pharmacy into here including location and phone number: N/A      Preferred Method of Contact: Phone Call  Patient's Preferred Phone Number on File: [phone]   Best Call Back Number, if different:  Additional Information: pt would like a call back in regards to atorvastatin prescription

## 2025-07-31 ENCOUNTER — TELEPHONE (OUTPATIENT)
Dept: INTERNAL MEDICINE | Facility: CLINIC | Age: 85
End: 2025-07-31
Payer: MEDICARE

## 2025-07-31 DIAGNOSIS — J45.20 MILD INTERMITTENT ASTHMA WITHOUT COMPLICATION: Chronic | ICD-10-CM

## 2025-07-31 RX ORDER — ALBUTEROL SULFATE 0.83 MG/ML
2.5 SOLUTION RESPIRATORY (INHALATION) EVERY 6 HOURS PRN
Qty: 28 EACH | Refills: 11 | Status: SHIPPED | OUTPATIENT
Start: 2025-07-31 | End: 2026-07-31

## 2025-07-31 NOTE — TELEPHONE ENCOUNTER
Copied from CRM #[id number]. Topic: Medications - New Medication Request  >> Jul 31, 2025  3:06 PM Varsha wrote:  Who Called: Kristi Garvey    Refill or New Rx:Refill  RX Name and Strength:albuterol (PROVENTIL) 2.5 mg /3 mL (0.083 %) nebulizer solution  How is the patient currently taking it? (ex. 1XDay):PRN  Is this a 30 day or 90 day RX:  Local or Mail Order:  List of preferred pharmacies on file (remove unneeded): [unfilled][facility] - [facility] AT Portal to [facility] Sites  If different Pharmacy is requested, enter Pharmacy information here including location and phone number:   Ordering Provider:      Preferred Method of Contact: Phone Call  Patient's Preferred Phone Number on File: [phone]   Best Call Back Number, if different:  Additional Information: Patient stated her insurance changed and she needs a new script for medication.

## 2025-07-31 NOTE — PROGRESS NOTES
Subjective:       Patient ID: Kristi Garvey is a 85 y.o. female.    Surgeon: Dr. Karine Garvey  PCP: Dr. 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  Surgery/pathology:  25--Right breast excisional biopsy done 25--DCIS, intermediate grade 8mm, clear margins, ER 94.2%.  Imagin. Screening MMG 3/16/25--Right breast 6:00 axis posterior depth calcifications are indeterminate and need further evaluation. BI-RADS 0: Incomplete. Need additional imaging evaluation. Right diagnostic mammography with magnifications views is recommended.   2. Diagnostic MMG done 3/18/25--Heterogeneous calcifications in a grouped/linear distribution spanning 10 mm in the 6:00 right breast, posterior depth, are suspicious.  A right breast tomosynthesis/stereotactic guided core needle biopsy is recommended, BI-RADS 4.    Treatment history:  Right breast lumpectomy 25.  Radiation oncology did not recommend radiation    Current treatment plan:   Arimidex X 5 years started on 25     Chief Complaint: Other Misc (Pt reports no concerns today./)    HPI  Patient presents for follow-up of DCIS. She started on Arimidex after radiation oncology Dr. Arriaga did not recommend radiation. And she is doing well so far with no side effects. Denies any new complaints today. She continues follow-up with GI for her autoimmune hepatitis and also continues annual follow-up with PCP Dr. Walker.     Past Medical History:   Diagnosis Date    Amenorrhea     Arthritis     Breast mass, right     Cholangiolitis     autoimmune liver    GERD (gastroesophageal reflux disease)     Hormone disorder     Infertility, female     1960s    Menopause     Early 1980s    Menstrual symptom or sign         Mild intermittent asthma, uncomplicated     Mixed hyperlipidemia     Osteoporosis     Pregnancy ; ;     TIA (transient ischemic  attack) 2019    Urinary incontinence     Around 2000    Vitamin D deficiency       Past Surgical History:   Procedure Laterality Date    ABDOMINAL SURGERY      BLADDER SUSPENSION      BREAST BIOPSY      BREAST SURGERY  In 1986  and in 2025    and 2025    CATARACT EXTRACTION Bilateral     CHOLECYSTECTOMY      COLPORRHAPHY  2005    COLPOSCOPY      Several times    EXCISION, MASS, BREAST, USING RADIOLOGICAL MARKER Right 05/27/2025    Procedure: EXCISION,MASS,BREAST,USING RADIOLOGICAL MARKER - [employer] Localization // Right / Need US / [employer] / [employer];  Surgeon: Karine Garvey MD;  Location: [facility] OR;  Service: General;  Laterality: Right;  need ultrasound, [facility], S[facility]IMAG    EYE SURGERY  2010    FOOT NEUROMA SURGERY Left     HERNIA REPAIR      HYSTERECTOMY      OOPHORECTOMY  2005     Family History   Problem Relation Name Age of Onset    Hypertension Father Nara Strauss     Kidney disease Father Nara Strauss     Lymphoma Brother Mamadou 70 - 79    Cancer Brother Mamadou     Hypertension Brother Mamadou     Multiple myeloma Brother Damian 65 - 69    Cancer Brother Damian     Prostate cancer Brother Willard 50 - 59    Cancer Brother Willard     Cancer Maternal Aunt Ping B[place] 70 - 79    Arthritis Mother [place] Carlos A     Osteoarthritis Mother Ermelinda Carlos A     Seizures Mother Ermelinda Carlos A     Hypertension Brother Meir Strauss     Seizures Sister Aparna Toche      Social History     Socioeconomic History    Marital status: Single   Tobacco Use    Smoking status: Never    Smokeless tobacco: Never   Vaping Use    Vaping status: Never Used   Substance and Sexual Activity    Alcohol use: Never    Drug use: Never    Sexual activity: Not Currently     Partners: Male     Comment: I had sex only with my      Social Drivers of Health     Financial Resource Strain: Low Risk  (2/26/2025)    Overall Financial Resource Strain (CARDIA)     Difficulty of Paying Living Expenses: Not hard at all   Food Insecurity: No Food Insecurity  "(2/26/2025)    Hunger Vital Sign     Worried About Running Out of Food in the Last Year: Never true     Ran Out of Food in the Last Year: Never true   Transportation Needs: No Transportation Needs (2/26/2025)    PRAPARE - Transportation     Lack of Transportation (Medical): No     Lack of Transportation (Non-Medical): No   Physical Activity: Sufficiently Active (2/26/2025)    Exercise Vital Sign     Days of Exercise per Week: 5 days     Minutes of Exercise per Session: 50 min   Stress: No Stress Concern Present (2/26/2025)    [employer] - Occupational Stress Questionnaire     Feeling of Stress : Not at all   Housing Stability: Low Risk  (2/26/2025)    Housing Stability Vital Sign     Unable to Pay for Housing in the Last Year: No     Number of Times Moved in the Last Year: 0     Homeless in the Last Year: No       Review of patient's allergies indicates:   Allergen Reactions    Aspirin Anaphylaxis    Codeine Nausea Only    Sulfa (sulfonamide antibiotics) Nausea Only      Medications Ordered Prior to Encounter[1]   Review of Systems   Constitutional:  Negative for appetite change and unexpected weight change.   HENT:  Negative for mouth sores.    Eyes:  Negative for visual disturbance.   Respiratory:  Negative for shortness of breath.    Cardiovascular:  Negative for chest pain.   Gastrointestinal:  Negative for abdominal pain and diarrhea.   Genitourinary:  Negative for frequency.   Musculoskeletal:  Negative for back pain.   Integumentary:  Negative for rash.   Neurological:  Negative for headaches.   Hematological:  Negative for adenopathy.   Psychiatric/Behavioral:  The patient is not nervous/anxious.               Vitals:    08/06/25 1101   BP: 124/83   Pulse: 82   Resp: 14   Temp: 97.9 °F (36.6 °C)   TempSrc: Oral   SpO2: 96%   Weight: 67.5 kg (148 lb 12.8 oz)   Height: 5' 3" (1.6 m)     Wt Readings from Last 3 Encounters:   08/06/25 1101 67.5 kg (148 lb 12.8 oz)   06/24/25 1410 68 " kg (150 lb)   06/05/25 1044 68.2 kg (150 lb 6.4 oz)     Physical Exam  Constitutional:       Appearance: Normal appearance.   HENT:      Head: Normocephalic.      Nose: Nose normal.      Mouth/Throat:      Mouth: Mucous membranes are moist.   Eyes:      Extraocular Movements: Extraocular movements intact.      Conjunctiva/sclera: Conjunctivae normal.   Cardiovascular:      Rate and Rhythm: Normal rate and regular rhythm.   Pulmonary:      Effort: Pulmonary effort is normal.      Breath sounds: Normal breath sounds.   Chest:      Comments: Right breast with inferior incision healed well (breasts not examined today)  Abdominal:      General: Bowel sounds are normal. There is no distension.      Palpations: Abdomen is soft.      Tenderness: There is no abdominal tenderness.   Musculoskeletal:         General: Normal range of motion.   Skin:     General: Skin is warm.   Neurological:      General: No focal deficit present.      Mental Status: She is alert and oriented to person, place, and time.   Psychiatric:         Mood and Affect: Mood normal.         Judgment: Judgment normal.       Lab Visit on 08/06/2025   Component Date Value    WBC 08/06/2025 6.05     RBC 08/06/2025 4.85     Hgb 08/06/2025 15.0     Hct 08/06/2025 46.8     MCV 08/06/2025 96.5 (H)     MCH 08/06/2025 30.9     MCHC 08/06/2025 32.1 (L)     RDW 08/06/2025 12.4     Platelet 08/06/2025 195     MPV 08/06/2025 9.6     Neut % 08/06/2025 49.1     Lymph % 08/06/2025 38.2     Mono % 08/06/2025 7.4     Eos % 08/06/2025 5.0     Basophil % 08/06/2025 0.3     Imm Grans % 08/06/2025 0.0     Neut # 08/06/2025 2.97     Lymph # 08/06/2025 2.31     Mono # 08/06/2025 0.45     Eos # 08/06/2025 0.30     Baso # 08/06/2025 0.02     Imm Gran # 08/06/2025 0.00    Admission on 05/27/2025, Discharged on 05/27/2025   Component Date Value    Pathology Result 05/27/2025     Clinical Support on 04/24/2025   Component Date Value    QRS Duration 04/24/2025 88     [facility] QTC  Calculation 04/24/2025 [address] [place] [address] Outpatient Visit on 04/09/2025   Component Date Value    Pathology Result 04/09/2025     Appointment on 04/07/2025   Component Date Value    Color, UA 04/07/2025 Light-Yellow     Appearance, UA 04/07/2025 Turbid (A)     Specific [place], UA 04/07/2025 1.007     pH, UA 04/07/2025 6.5     Protein, UA 04/07/2025 Negative     Glucose, UA 04/07/2025 Normal     Ketones, UA 04/07/2025 Negative     Blood, UA 04/07/2025 1+ (A)     Bilirubin, UA 04/07/2025 Negative     Urobilinogen, UA 04/07/2025 Normal     Nitrites, UA 04/07/2025 Negative     Leukocyte Esterase, UA 04/07/2025 500 (A)     RBC, UA 04/07/2025 6-10 (A)     WBC, UA 04/07/2025 >100 (A)     WBC Clumps, UA 04/07/2025 Few (A)     Bacteria, UA 04/07/2025 Occasional (A)     Squamous Epithelial Cell* 04/07/2025 Trace     Transitional Epithelial * 04/07/2025 Trace (A)     Urine Culture 04/07/2025 >/= 100,000 colonies/ml Citrobacter koseri (A)    Lab Visit on 02/14/2025   Component Date Value    Sodium 02/14/2025 142     Potassium 02/14/2025 3.9     Chloride 02/14/2025 107     CO2 02/14/2025 29     Glucose 02/14/2025 97     Blood Urea Nitrogen 02/14/2025 13.5     Creatinine 02/14/2025 0.73     Calcium 02/14/2025 10.1     Protein Total 02/14/2025 6.7     Albumin 02/14/2025 4.0     Globulin 02/14/2025 2.7     Albumin/Globulin Ratio 02/14/2025 1.5     Bilirubin Total 02/14/2025 1.0     ALP 02/14/2025 85     ALT 02/14/2025 22     AST 02/14/2025 18     eGFR 02/14/2025 >60     Anion Gap 02/14/2025 6.0     BUN/Creatinine Ratio 02/14/2025 18     Cholesterol Total 02/14/2025 194     HDL Cholesterol 02/14/2025 57     Triglyceride 02/14/2025 113     Cholesterol/HDL Ratio 02/14/2025 3     Very Low Density Lipopro* 02/14/2025 23     LDL Cholesterol 02/14/2025 114.00     Color, UA 02/14/2025 Colorless     Appearance, UA 02/14/2025 Clear     Specific Gravity, UA 02/14/2025 1.004 (L)     pH, UA 02/14/2025 7.0     Protein, UA 02/14/2025 Negative      Glucose, UA 02/14/2025 Normal     Ketones, UA 02/14/2025 Negative     Blood, UA 02/14/2025 Negative     Bilirubin, UA 02/14/2025 Negative     Urobilinogen, UA 02/14/2025 Normal     Nitrites, UA 02/14/2025 Negative     Leukocyte Esterase, UA 02/14/2025 Negative     RBC, UA 02/14/2025 None Seen     WBC, UA 02/14/2025 0-5     Bacteria, UA 02/14/2025 Trace     Squamous Epithelial Cell* 02/14/2025 Trace     Vitamin D 02/14/2025 67     WBC 02/14/2025 4.98     RBC 02/14/2025 5.04     Hgb 02/14/2025 15.3     Hct 02/14/2025 47.3 (H)     MCV 02/14/2025 93.8     MCH 02/14/2025 30.4     MCHC 02/14/2025 32.3 (L)     RDW 02/14/2025 13.1     Platelet 02/14/2025 215     MPV 02/14/2025 9.6     Neut % 02/14/2025 48.2     Lymph % 02/14/2025 41.4     Mono % 02/14/2025 5.4     Eos % 02/14/2025 4.0     Basophil % 02/14/2025 0.8     Imm Grans % 02/14/2025 0.2     Neut # 02/14/2025 2.40     Lymph # 02/14/2025 2.06     Mono # 02/14/2025 0.27     Eos # 02/14/2025 0.20     Baso # 02/14/2025 0.04     Imm Gran # 02/14/2025 0.01     NRBC% 02/14/2025 0.0          Assessment:       1. Ductal carcinoma in situ (DCIS) of right breast        Plan:       Patient with right breast DCIS s/p lumpectomy done 5/27/25. DCIS measured 8mm, clear margins, Grade 2, ER+.  Recommendations per [facility] are for adjuvant RT and endocrine therapy.  Patient with advanced age, but she is very active for her age, had a normal bone density in 2020.   Patient met with Dr. Arriaga to discuss RT and not recommended.   Recommended treatment with Arimidex X 5 years.    Patient started Arimidex on 7/7/25.  So far she is tolerating well.  CBC today good, will f/u CMP.     DEXA was ordered but has not been scheduled. She had a normal DEXA in 2020.  Will get this scheduled.    F/u visit in 3 months to be sure she is still doing well with Arimidex.  If good after next visit, can move visits to every 6 months.     Dr. Garvey will be ordering her follow-up imaging and she sees her in  9/2025.    Discussed previously with patient usual excellent prognosis of DCIS with treatment given small size and favorable characteristics.     All questions answered at this time.    Eneida Flanagan MD       - code applied: patient requires or will require a continuous, longitudinal, and active collaborative plan of care related to this patient's health condition, DCIS--the management of which requires the direction of a practitioner with specialized clinical knowledge, skill, and expertise.                                    [1]   Current Outpatient Medications on File Prior to Visit   Medication Sig Dispense Refill    albuterol (PROVENTIL) 2.5 mg /3 mL (0.083 %) nebulizer solution Take 3 mLs (2.5 mg total) by nebulization every 6 (six) hours as needed for Wheezing. Rescue 28 each 11    anastrozole (ARIMIDEX) 1 mg Tab Take 1 tablet (1 mg total) by mouth once daily. 90 tablet 2    atorvastatin (LIPITOR) 20 MG tablet Take 1.5 tablets (30 mg total) by mouth Daily. 135 tablet 3    azelastine (ASTELIN) 137 mcg (0.1 %) nasal spray 1 spray (137 mcg total) by Nasal route Daily. 30 mL 11    calcium carbonate (CALCIUM 600) 600 mg calcium (1,500 mg) Tab Take 600 mg by mouth once daily.      clopidogreL (PLAVIX) 75 mg tablet Take 1 tablet (75 mg total) by mouth once daily. 90 tablet 3    fluticasone propionate (FLONASE) 50 mcg/actuation nasal spray SPRAY 2 SPRAYS INTO EACH NOSTRIL EVERY DAY 48 mL 1    milk thistle 500 mg Cap Take 1 Capful by mouth once daily.      multivit-min-FA-lycopen-lutein (CENTRUM SILVER) 0.4 mg-300 mcg- 250 mcg Tab Take 1 tablet by mouth once daily.      omega-3/dha/epa/dpa/fish oil (OMEGA-3 2100 ORAL) Take by mouth.      pantoprazole (PROTONIX) 40 MG tablet Take 40 mg by mouth once daily.      ursodioL (ACTIGALL) 300 mg capsule Take 1 capsule by mouth once daily.      vitamin D (VITAMIN D3) 1000 units Tab tablet       No current facility-administered medications on file prior to visit.

## 2025-08-05 ENCOUNTER — TELEPHONE (OUTPATIENT)
Dept: INTERNAL MEDICINE | Facility: CLINIC | Age: 85
End: 2025-08-05
Payer: MEDICARE

## 2025-08-05 NOTE — TELEPHONE ENCOUNTER
Copied from CRM #[id number]. Topic: General Inquiry - Patient Advice  >> Aug 5, 2025  2:34 PM Yeny wrote:  Who Called: Kristi Garvey    Caller is requesting assistance/information from provider's office.    Symptoms (please be specific): n/a   How long has patient had these symptoms:  n/a  List of preferred pharmacies on file (remove unneeded): [facility] AT [address] TO [facility] SITES        Preferred Method of Contact: Phone Call  Patient's Preferred Phone Number on File: [phone]   Best Call Back Number, if different:  Additional Information: Pt stated that pharmacy informed her they need a PA for albuterol (PROVENTIL) 2.5 mg /3 mL (0.083 %) nebulizer solution.

## 2025-08-06 ENCOUNTER — LAB VISIT (OUTPATIENT)
Dept: LAB | Facility: HOSPITAL | Age: 85
End: 2025-08-06
Attending: INTERNAL MEDICINE
Payer: MEDICARE

## 2025-08-06 ENCOUNTER — OFFICE VISIT (OUTPATIENT)
Dept: HEMATOLOGY/ONCOLOGY | Facility: CLINIC | Age: 85
End: 2025-08-06
Payer: MEDICARE

## 2025-08-06 VITALS
SYSTOLIC BLOOD PRESSURE: 124 MMHG | DIASTOLIC BLOOD PRESSURE: 83 MMHG | OXYGEN SATURATION: 96 % | BODY MASS INDEX: 26.37 KG/M2 | RESPIRATION RATE: 14 BRPM | TEMPERATURE: 98 F | HEART RATE: 82 BPM | WEIGHT: 148.81 LBS | HEIGHT: 63 IN

## 2025-08-06 DIAGNOSIS — D05.11 DUCTAL CARCINOMA IN SITU (DCIS) OF RIGHT BREAST: Primary | ICD-10-CM

## 2025-08-06 DIAGNOSIS — D05.11 DUCTAL CARCINOMA IN SITU (DCIS) OF RIGHT BREAST: ICD-10-CM

## 2025-08-06 LAB
ALBUMIN SERPL-MCNC: 3.6 G/DL (ref 3.4–4.8)
ALBUMIN/GLOB SERPL: 1.1 RATIO (ref 1.1–2)
ALP SERPL-CCNC: 85 UNIT/L (ref 40–150)
ALT SERPL-CCNC: 20 UNIT/L (ref 0–55)
ANION GAP SERPL CALC-SCNC: 7 MEQ/L
AST SERPL-CCNC: 18 UNIT/L (ref 11–45)
BASOPHILS # BLD AUTO: 0.02 X10(3)/MCL
BASOPHILS NFR BLD AUTO: 0.3 %
BILIRUB SERPL-MCNC: 1 MG/DL
BUN SERPL-MCNC: 15.2 MG/DL (ref 9.8–20.1)
CALCIUM SERPL-MCNC: 10.1 MG/DL (ref 8.4–10.2)
CHLORIDE SERPL-SCNC: 106 MMOL/L (ref 98–107)
CO2 SERPL-SCNC: 29 MMOL/L (ref 23–31)
CREAT SERPL-MCNC: 0.7 MG/DL (ref 0.55–1.02)
CREAT/UREA NIT SERPL: 22
EOSINOPHIL # BLD AUTO: 0.3 X10(3)/MCL (ref 0–0.9)
EOSINOPHIL NFR BLD AUTO: 5 %
ERYTHROCYTE [DISTWIDTH] IN BLOOD BY AUTOMATED COUNT: 12.4 % (ref 11.5–17)
GFR SERPLBLD CREATININE-BSD FMLA CKD-EPI: >60 ML/MIN/1.73/M2
GLOBULIN SER-MCNC: 3.2 GM/DL (ref 2.4–3.5)
GLUCOSE SERPL-MCNC: 114 MG/DL (ref 82–115)
HCT VFR BLD AUTO: 46.8 % (ref 37–47)
HGB BLD-MCNC: 15 G/DL (ref 12–16)
IMM GRANULOCYTES # BLD AUTO: 0 X10(3)/MCL (ref 0–0.04)
IMM GRANULOCYTES NFR BLD AUTO: 0 %
LYMPHOCYTES # BLD AUTO: 2.31 X10(3)/MCL (ref 0.6–4.6)
LYMPHOCYTES NFR BLD AUTO: 38.2 %
MCH RBC QN AUTO: 30.9 PG (ref 27–31)
MCHC RBC AUTO-ENTMCNC: 32.1 G/DL (ref 33–36)
MCV RBC AUTO: 96.5 FL (ref 80–94)
MONOCYTES # BLD AUTO: 0.45 X10(3)/MCL (ref 0.1–1.3)
MONOCYTES NFR BLD AUTO: 7.4 %
NEUTROPHILS # BLD AUTO: 2.97 X10(3)/MCL (ref 2.1–9.2)
NEUTROPHILS NFR BLD AUTO: 49.1 %
PLATELET # BLD AUTO: 195 X10(3)/MCL (ref 130–400)
PMV BLD AUTO: 9.6 FL (ref 7.4–10.4)
POTASSIUM SERPL-SCNC: 4.1 MMOL/L (ref 3.5–5.1)
PROT SERPL-MCNC: 6.8 GM/DL (ref 5.8–7.6)
RBC # BLD AUTO: 4.85 X10(6)/MCL (ref 4.2–5.4)
SODIUM SERPL-SCNC: 142 MMOL/L (ref 136–145)
WBC # BLD AUTO: 6.05 X10(3)/MCL (ref 4.5–11.5)

## 2025-08-06 PROCEDURE — 99214 OFFICE O/P EST MOD 30 MIN: CPT | Mod: PBBFAC | Performed by: INTERNAL MEDICINE

## 2025-08-06 PROCEDURE — 36415 COLL VENOUS BLD VENIPUNCTURE: CPT

## 2025-08-06 PROCEDURE — 99214 OFFICE O/P EST MOD 30 MIN: CPT | Mod: S$PBB,,, | Performed by: INTERNAL MEDICINE

## 2025-08-06 PROCEDURE — G2211 COMPLEX E/M VISIT ADD ON: HCPCS | Mod: ,,, | Performed by: INTERNAL MEDICINE

## 2025-08-06 PROCEDURE — 85025 COMPLETE CBC W/AUTO DIFF WBC: CPT

## 2025-08-06 PROCEDURE — 80053 COMPREHEN METABOLIC PANEL: CPT

## 2025-08-06 PROCEDURE — 99999 PR PBB SHADOW E&M-EST. PATIENT-LVL IV: CPT | Mod: PBBFAC,,, | Performed by: INTERNAL MEDICINE

## 2025-08-13 ENCOUNTER — HOSPITAL ENCOUNTER (OUTPATIENT)
Dept: RADIOLOGY | Facility: HOSPITAL | Age: 85
Discharge: HOME | End: 2025-08-13
Attending: INTERNAL MEDICINE
Payer: MEDICARE

## 2025-08-13 DIAGNOSIS — Z78.0 MENOPAUSE: ICD-10-CM

## 2025-08-13 PROCEDURE — 77081 DXA BONE DENSITY APPENDICULR: CPT | Mod: TC

## 2025-08-13 PROCEDURE — 77080 DXA BONE DENSITY AXIAL: CPT | Mod: XU,TC

## 2025-09-03 ENCOUNTER — TELEPHONE (OUTPATIENT)
Dept: INTERNAL MEDICINE | Facility: CLINIC | Age: 85
End: 2025-09-03
Payer: MEDICARE

## 2025-09-03 DIAGNOSIS — J45.20 MILD INTERMITTENT ASTHMA WITHOUT COMPLICATION: Primary | ICD-10-CM

## 2025-09-03 RX ORDER — FLUTICASONE PROPIONATE AND SALMETEROL 100; 50 UG/1; UG/1
1 POWDER RESPIRATORY (INHALATION) 2 TIMES DAILY
Qty: 180 EACH | Refills: 3 | Status: SHIPPED | OUTPATIENT
Start: 2025-09-03 | End: 2026-09-03

## 2025-09-03 RX ORDER — FLUTICASONE PROPIONATE AND SALMETEROL 100; 50 UG/1; UG/1
1 POWDER RESPIRATORY (INHALATION) 2 TIMES DAILY
Qty: 180 EACH | Refills: 3 | Status: SHIPPED | OUTPATIENT
Start: 2025-09-03 | End: 2025-09-03

## (undated) DEVICE — COVER PROBE US 5.5X58L NON LTX

## (undated) DEVICE — SUT 2/0 30IN SILK BLK BRAI

## (undated) DEVICE — SUT VICRYL 3-0 27 SH

## (undated) DEVICE — SUPPORT ULNA NERVE PROTECTOR

## (undated) DEVICE — GOWN POLY REINF BRTH SLV XL

## (undated) DEVICE — GLOVE SENSICARE PI MICRO 6

## (undated) DEVICE — NDL SYR 10ML 18X1.5 LL BLUNT

## (undated) DEVICE — STRIP MEDI WND CLSR 1/4X4IN

## (undated) DEVICE — NDL HYPO REG 25G X 1 1/2

## (undated) DEVICE — SPONGE LAP 18X18 PREWASHED

## (undated) DEVICE — SUT MCRYL PLUS 4-0 PS2 27IN

## (undated) DEVICE — PENCIL SMOKE EVAC TELSCP 15FT

## (undated) DEVICE — ELECTRODE BLADE INSULATED 1 IN

## (undated) DEVICE — Device

## (undated) DEVICE — BRA MARENA B 38-40 LG BEIGE

## (undated) DEVICE — SPONGE COTTON TRAY 4X4IN

## (undated) DEVICE — PAD PREP CUFFED NS 24X48IN

## (undated) DEVICE — CHARM MARGINMAP SPEC 5MM

## (undated) DEVICE — ELECTRODE PATIENT RETURN DISP

## (undated) DEVICE — SOL IRRI STRL WATER 1000ML